# Patient Record
Sex: FEMALE | Race: WHITE | NOT HISPANIC OR LATINO | Employment: FULL TIME | URBAN - METROPOLITAN AREA
[De-identification: names, ages, dates, MRNs, and addresses within clinical notes are randomized per-mention and may not be internally consistent; named-entity substitution may affect disease eponyms.]

---

## 2020-03-26 ENCOUNTER — TELEPHONE (OUTPATIENT)
Dept: DERMATOLOGY | Facility: CLINIC | Age: 45
End: 2020-03-26

## 2020-03-26 ENCOUNTER — TRANSCRIBE ORDERS (OUTPATIENT)
Dept: OTHER | Age: 45
End: 2020-03-26

## 2020-03-26 DIAGNOSIS — C44.792 DERMATOFIBROSARCOMA PROTUBERANS OF LOWER EXTREMITY, RIGHT: Primary | ICD-10-CM

## 2020-03-26 NOTE — TELEPHONE ENCOUNTER
Pt called back and spoke to nurse who informed her of all the information we need (path, pictures, etc) Pt confirmed understanding verbally and fax number was given.     Pt also informed of having the consult via telephone, no complaints or issues.     Call back number was given if pt has any issues.    Dena Edgar LPN

## 2020-03-26 NOTE — TELEPHONE ENCOUNTER
Pt informed nurse that information was sent this morning via fax. Routing to Radha Deluna to see if she has seen the information.    Dena Edgar LPN

## 2020-03-26 NOTE — TELEPHONE ENCOUNTER
Attempted to call patient back regarding urgent referral. LVM and call back number was provided.    We need the referral from her doctors office along with pathology and a photo.    Dena Edgar LPN

## 2020-03-27 ENCOUNTER — TELEPHONE (OUTPATIENT)
Dept: DERMATOLOGY | Facility: CLINIC | Age: 45
End: 2020-03-27

## 2020-03-27 NOTE — TELEPHONE ENCOUNTER
[facility] Call Center    Phone Message    May a detailed message be left on voicemail: yes     Reason for Call: Other: Yue with [facility] (referring clinic) called to follow up on referral. Yue notifed that referral was received and message was sent to clinic for review/scheduling. Yue asked if she could get a call back from the clinic to be notified of when patient will be scheduled. Please call Yue at [phone], ok to leave detailed message per Yue's request.     Action Taken: Message routed to:  [facility] ([facility] Dermatology    Travel Screening: Not Applicable

## 2020-03-27 NOTE — TELEPHONE ENCOUNTER
"I called Yue back and she states that the patient needs to be seen for a right ankle lesion, dermatofibroscarcoma protruberans. She states that their surgeon dr. Alexandre De Jesus spoke with someone here at the [facility] and he was told that we would \"work the patient in.\" I let her know that I would speak with one of our MOHS surgeons to see if this is something that we would operate on or if this needs to go to general surgery or oncology surgery and I would get back to her. This message was routed to Dr. Ellis, Dr. Woo and Dr. Harris.   Sasha Gil [facility]    "

## 2020-03-30 NOTE — TELEPHONE ENCOUNTER
I left a message for the patient to attempt to schedule this telephone consult. I left my call back number for her to call and schedule.    Chikis Braswell  Periop Derm/Surg   [phone]

## 2020-03-31 NOTE — TELEPHONE ENCOUNTER
Called and left patient a message that Dr. Woo says there is no need to quit working prior to her procedure. 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     Chikis Braswell  Periop Derm/Surg   [phone]

## 2020-04-01 ENCOUNTER — TELEPHONE (OUTPATIENT)
Dept: DERMATOLOGY | Facility: CLINIC | Age: 45
End: 2020-04-01

## 2020-04-01 NOTE — TELEPHONE ENCOUNTER
[facility] Call Center    Phone Message    May a detailed message be left on voicemail: yes     Reason for Call: Other: Yue from [facility] calling back to speak with the Rn about the uploaded pictures for the pt.  Please call her back at [phone]     Action Taken: Message routed to:  [facility] Derm    Travel Screening: Not Applicable

## 2020-04-01 NOTE — TELEPHONE ENCOUNTER
Called Yue back, still cannot see photo in care everywhere. Yue working on getting us picture.     Dena Edgar LPN

## 2020-04-01 NOTE — TELEPHONE ENCOUNTER
Called patient to see if she was interested doing a video visit. Decided to stick with her telephone visit.

## 2020-04-07 ENCOUNTER — VIRTUAL VISIT (OUTPATIENT)
Dept: DERMATOLOGY | Facility: CLINIC | Age: 45
End: 2020-04-07
Payer: COMMERCIAL

## 2020-04-07 DIAGNOSIS — C44.792 DERMATOFIBROSARCOMA PROTUBERANS OF LOWER EXTREMITY, RIGHT: Primary | ICD-10-CM

## 2020-04-07 RX ORDER — CETIRIZINE HYDROCHLORIDE 10 MG/1
TABLET ORAL
COMMUNITY
Start: 2019-10-30 | End: 2021-11-10

## 2020-04-07 RX ORDER — VALACYCLOVIR HYDROCHLORIDE 1 G/1
2000 TABLET, FILM COATED ORAL
COMMUNITY
Start: 2020-03-10 | End: 2021-11-10

## 2020-04-07 RX ORDER — ESTRADIOL 0.1 MG/G
CREAM VAGINAL
COMMUNITY
Start: 2019-11-26 | End: 2021-11-10

## 2020-04-07 RX ORDER — BUPROPION HYDROCHLORIDE 150 MG/1
150 TABLET ORAL
COMMUNITY
Start: 2019-06-03

## 2020-04-07 RX ORDER — MONTELUKAST SODIUM 10 MG/1
TABLET ORAL
COMMUNITY
Start: 2019-04-29 | End: 2021-11-10

## 2020-04-07 RX ORDER — AMLODIPINE BESYLATE 5 MG/1
5 TABLET ORAL
COMMUNITY
Start: 2019-05-21 | End: 2020-05-20

## 2020-04-07 RX ORDER — PAROXETINE 40 MG/1
TABLET, FILM COATED ORAL EVERY MORNING
COMMUNITY

## 2020-04-07 RX ORDER — IBUPROFEN 800 MG/1
TABLET, FILM COATED ORAL
COMMUNITY
Start: 2019-08-05

## 2020-04-07 RX ORDER — LORAZEPAM 0.5 MG/1
TABLET ORAL
COMMUNITY
Start: 2019-04-01

## 2020-04-07 RX ORDER — LOSARTAN POTASSIUM 25 MG/1
TABLET ORAL
COMMUNITY
Start: 2019-08-13 | End: 2021-11-10

## 2020-04-07 RX ORDER — LACTOBACILLUS RHAMNOSUS GG 10B CELL
1 CAPSULE ORAL 2 TIMES DAILY
COMMUNITY
End: 2021-11-10

## 2020-04-07 RX ORDER — ATENOLOL 50 MG/1
TABLET ORAL
COMMUNITY
Start: 2019-04-08 | End: 2021-11-10

## 2020-04-07 ASSESSMENT — PAIN SCALES - GENERAL: PAINLEVEL: MILD PAIN (2)

## 2020-04-07 NOTE — PROGRESS NOTES
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  Schedule Mohs surgery.  Anticipate graft vs. Keystone flap repair.  Counseled patient and answered questions about Mohs surgery and r/b of surgery.        Follow-up:   Follow up for surgery next week.     Staff only:    JEANE Camacho M.D.    _____________________________________________________________________________    Dermatology Problem List:  DFSP - ankle    Encounter Date: Apr 7, 2020    CC:   Chief Complaint   Patient presents with     Derm Problem     Marina is having consult for DFSP       History of Present Illness:  I have reviewed the teledermatology information and the nursing intake corresponding to this issue. Marina Urbina is a 44 year old female who presents via teledermatology for evaluation of DFSP on ankle.  Pt has no prior history of skin cancer.  She has had multiple \"cysts\" over the last 3 decades in that area after a traumatic injury.  She noticed \"regrowth of the area\" over the last 6 months.      ROS: Patient is generally feeling well today     Physical Examination:  General: Well-appearing female appropriately-developed individual.  Skin: Awaiting receipt of photos    Labs:    Past Medical History:   There is no problem list on file for this patient.    No past medical history on file.  No past surgical history on file.    Social History:  N/C    Family History:  No family history on file.    Medications:  Current Outpatient Medications   Medication     amLODIPine (NORVASC) 5 MG tablet     atenolol (TENORMIN) 50 MG tablet     buPROPion (WELLBUTRIN XL) 150 MG 24 hr tablet     cetirizine (ZYRTEC) 10 MG tablet     estradiol (ESTRACE) 0.1 MG/GM vaginal cream     " ibuprofen (ADVIL/MOTRIN) 800 MG tablet     lactobacillus rhamnosus, GG, (CULTURELL) capsule     LORazepam (ATIVAN) 0.5 MG tablet     losartan (COZAAR) 25 MG tablet     metFORMIN (GLUCOPHAGE) 500 MG tablet     montelukast (SINGULAIR) 10 MG tablet     PARoxetine (PAXIL) 40 MG tablet     valACYclovir (VALTREX) 1000 mg tablet     No current facility-administered medications for this visit.           Allergies   Allergen Reactions     Bactrim [Sulfamethoxazole W/Trimethoprim]      Flagyl [Metronidazole]      Penicillins Diarrhea and Rash     Verified in [employer]ite[employer]: Y, Severity in Meditech: S           _____________________________________________________________________________    Teledermatology information:  - Location of patient: Home  - Patient presented as: provider referral  - Location of teledermatologist:  [facility] DERMATOLOGIC SURGERY )  - Reason teledermatology is appropriate:  of National Emergency Regarding Coronavirus disease (COVID 19) Outbreak  - Image quality and interpretability: unable to interpret-please re-submit consult  - Physician has received verbal consent for a Video/Photos Visit from the patient? Yes  - In-person dermatology visit recommendation: yes - for jg  - Date of images: n/z  - Service start time:11  - Service end time:11:20  - Date of report: 04/07/20

## 2020-04-07 NOTE — NURSING NOTE
Chief Complaint   Patient presents with     Derm Problem     Marina is having consult for DFSP     Dena Edgar LPN

## 2020-04-08 NOTE — TELEPHONE ENCOUNTER
FUTURE VISIT INFORMATION      FUTURE VISIT INFORMATION:    Date: 4/14/20    Time: 8:30am    Location: [facility]  REFERRAL INFORMATION:    Referring provider:  Roshan Woo MD     Referring providers clinic:  [facility]    Reason for visit/diagnosis  MOHS x1 DFSP    RECORDS REQUESTED FROM:       Clinic name Comments Records Status Imaging Status   [facility] Virtual Visit 4/7/20 Epic    [facility] OV/referral 3/26/20  Biopsy 3/11/20  Surgery 7/11/19 [facility]

## 2020-04-14 ENCOUNTER — OFFICE VISIT (OUTPATIENT)
Dept: DERMATOLOGY | Facility: CLINIC | Age: 45
End: 2020-04-14
Payer: COMMERCIAL

## 2020-04-14 ENCOUNTER — PRE VISIT (OUTPATIENT)
Dept: DERMATOLOGY | Facility: CLINIC | Age: 45
End: 2020-04-14

## 2020-04-14 VITALS — HEART RATE: 71 BPM | DIASTOLIC BLOOD PRESSURE: 92 MMHG | SYSTOLIC BLOOD PRESSURE: 152 MMHG

## 2020-04-14 DIAGNOSIS — C44.792 DERMATOFIBROSARCOMA PROTUBERANS OF LOWER EXTREMITY, RIGHT: Primary | ICD-10-CM

## 2020-04-14 RX ORDER — OXYCODONE AND ACETAMINOPHEN 5; 325 MG/1; MG/1
1 TABLET ORAL EVERY 6 HOURS PRN
Qty: 15 TABLET | Refills: 0 | Status: SHIPPED | OUTPATIENT
Start: 2020-04-14 | End: 2020-04-17

## 2020-04-14 ASSESSMENT — PAIN SCALES - GENERAL: PAINLEVEL: MILD PAIN (2)

## 2020-04-14 NOTE — PROGRESS NOTES
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)  Location:Right ankle lateral)  Postoperative Diagnosis: same  Preoperative Lesion size: 5.0 x 4.8 cm   Final wound size: 6.0 x 5.0 cm    After appropriate discussion and informed consent for Mohs surgery and possible repair of the Mohs surgery defect, the patient underwent Mohs surgery as follows:    Debulking and Confirmatory Biopsy:  Tumor was localized, anesthetized and removed by tangential shave excision (not derived by Mohs surgery) for processing by frozen section pathology in order to confirm the diagnosis and location of the skin cancer and to provide pathological material for subsequent comparison of Mohs sections.The patient then underwent the following surgery.    STAGE I:  The patient was placed on the operating room table.  The area was cleansed with chlorhexidine and infiltrated with 1% Lidocaine and epinephrine. Tumor was debulked. Using a #15-blade, complete excision was made around the tumor in 1 section.  Hemostasis was obtained by electrodesiccation.  A dressing was placed.  Tissue was divided into 8 tissue block(s) that were subsequently mapped, color coded and processed in the Mohs Laboratory.  Microscopic tumor (DFSP) was  found in 7 of the tissue blocks.    STAGE II: The patient returned to the operating room.  By reference to the Mohs map, the area of positivity was delineated, infiltrated with the anesthetic mixture described above and excised in 1 section. Tissue was divided into 2 tissue block(s) that were again mapped, color coded and processed in the Mohs Laboratory.  Hemostasis was obtained in the usual manner and a dressing  placed.   Microscopic tumor was not found in any of the tissue blocks.      With the lesion clear of micrographic tumor, surgery was considered complete.  The defect extended to the muscle/tendon and measured 6.0 x 5.0 cm.    RECONSTRUCTIVE DERMATOLOGIC SURGERY REPORT  We discussed the options for wound management in full with the patient including risks/benefits/possible outcomes.     Reconstruction: Keystone  island flap    PROCEDURE:  A Keystone shaped flap was designed based on the diameter of the primary defect, and positioned in a manner that maximized tissue recruitment. The wound was then debeveled, and the flap was incised sharply using a 15 blade scalpel. Dissection was carried vertically through skin and subcutis to the level of muscular fascia on all sides of the fasciocutaneous island flap. Broad undermining was performed at the non-flap margin of the primary defect. Hemostasis was obtained using electrocoagulation. The flap then first moved into place and secured with  3-0 vicryl deep pulley and buried vertical mattress sutures. The tissue underlying the SCDs were undermined and rotated in to close the primary defect; standing cones were not removed and redundancies left in place.  After securing the flap, the resulting secondary defect was then sutured closed using 3-0 vicryl and 4-0 monocryl buried vertical mattress sutures, with both apices closing via V-Y advancement technique. Next, epidermal edges were approximated using 4-0 prolene sutures with simple interrupted and running-locked techniques. A small area at the superior aspect of the secondary defect (superior shin) was not resurfaced primarily and instead a burow's FTSG was performed. The graft skin was taken from a redundant portion of the superolateral aspect of the keystone flap. This FTSG was defatted and trimmed to fit the defect.  It was sutured into place using 5-0 fast gut sutures. FTSG size was 1.3 x 0.8 cm.At the superior  v-y area of the flap a 3-0 vicryl superficial pulley was placed.  After completion of the procedure, the wound was cleansed with soap/saline, and ointment was applied along the wound surface. A sterile pressure dressing of non-adherent gauze was applied followed by an Unna Boot. This will be changed in 7 days. Wound care instructions were given verbally and in writing. The patient will have sutures removed in 14 days. The patient left the operating suite in stable condition.    Suture removal 14 days; Unna boot change in 7 days.     Patient was given written and verbal wound care procedures prior to discharge.      Staff Involved:    Ward Harris MD  PGY5 Dermatology  Mohs Surgery Fellow  [phone]       Attending attestation:  I was present for key elements of the procedure and immediately available for all other portions of the procedure.  I have reviewed the note and edited it as necessary.    Roshan Woo M.D.  Professor  Director of Dermatologic Surgery  Department of Dermatology  [facility]    Dermatology Surgery Clinic  [facility] Surgery [address] [address]

## 2020-04-14 NOTE — NURSING NOTE
Chief Complaint   Patient presents with     Derm Problem     Marina is here today for MOHS procedure on her right ankle     Dena Edgar LPN

## 2020-04-14 NOTE — LETTER
4/14/2020       RE: Marina Urbina  [address] [facility] [address]     Dear Colleague,    Thank you for referring your patient, Marina Urbina, to the [facility] DERMATOLOGIC SURGERY at [facility]. Please see a copy of my visit note below.    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)  Location:Right ankle lateral)  Postoperative Diagnosis: same  Preoperative Lesion size: 5.0 x 4.8 cm   Final wound size: 6.0 x 5.0 cm    After appropriate discussion and informed consent for Mohs surgery and possible repair of the Mohs surgery defect, the patient underwent Mohs surgery as follows:    Debulking and Confirmatory Biopsy:  Tumor was localized, anesthetized and removed by tangential shave excision (not derived by Mohs surgery) for processing by frozen section pathology in order to confirm the diagnosis and location of the skin cancer and to provide pathological material for subsequent comparison of Mohs sections.The patient then underwent the following surgery.    STAGE I:  The patient was placed on the operating room table.  The area was cleansed with chlorhexidine and infiltrated with 1% Lidocaine and epinephrine. Tumor was debulked. Using a #15-blade, complete excision was made around the tumor in 1 section.  Hemostasis was obtained by electrodesiccation.  A dressing was placed.  Tissue was divided into 8 tissue block(s) that were subsequently mapped, color coded and processed in the Mohs Laboratory.  Microscopic tumor (DFSP) was  found in 7 of the tissue blocks.    STAGE II: The patient returned to the operating room.  By reference to the Mohs map, the  area of positivity was delineated, infiltrated with the anesthetic mixture described above and excised in 1 section. Tissue was divided into 2 tissue block(s) that were again mapped, color coded and processed in the Mohs Laboratory.  Hemostasis was obtained in the usual manner and a dressing placed.   Microscopic tumor was not found in any of the tissue blocks.      With the lesion clear of micrographic tumor, surgery was considered complete.  The defect extended to the muscle/tendon and measured 6.0 x 5.0 cm.    RECONSTRUCTIVE DERMATOLOGIC SURGERY REPORT  We discussed the options for wound management in full with the patient including risks/benefits/possible outcomes.     Reconstruction: Keystone  island flap    PROCEDURE:  A Keystone shaped flap was designed based on the diameter of the primary defect, and positioned in a manner that maximized tissue recruitment. The wound was then debeveled, and the flap was incised sharply using a 15 blade scalpel. Dissection was carried vertically through skin and subcutis to the level of muscular fascia on all sides of the fasciocutaneous island flap. Broad undermining was performed at the non-flap margin of the primary defect. Hemostasis was obtained using electrocoagulation. The flap then first moved into place and secured with  3-0 vicryl deep pulley and buried vertical mattress sutures. The tissue underlying the SCDs were undermined and rotated in to close the primary defect; standing cones were not removed and redundancies left in place.  After securing the flap, the resulting secondary defect was then sutured closed using 3-0 vicryl and 4-0 monocryl buried vertical mattress sutures, with both apices closing via V-Y advancement technique. Next, epidermal edges were approximated using 4-0 prolene sutures with simple interrupted and running-locked techniques. A small area at the superior aspect of the secondary defect (superior shin) was not resurfaced primarily  and instead a burow's FTSG was performed. The graft skin was taken from a redundant portion of the superolateral aspect of the keystone flap. This FTSG was defatted and trimmed to fit the defect.  It was sutured into place using 5-0 fast gut sutures. FTSG size was 1.3 x 0.8 cm.At the superior v-y area of the flap a 3-0 vicryl superficial pulley was placed.  After completion of the procedure, the wound was cleansed with soap/saline, and ointment was applied along the wound surface. A sterile pressure dressing of non-adherent gauze was applied followed by an Unna Boot. This will be changed in 7 days. Wound care instructions were given verbally and in writing. The patient will have sutures removed in 14 days. The patient left the operating suite in stable condition.    Suture removal 14 days; Unna boot change in 7 days.     Patient was given written and verbal wound care procedures prior to discharge.      Staff Involved:    Ward Harris MD  PGY5 Dermatology  Mohs Surgery Fellow  [phone]       Attending attestation:  I was present for key elements of the procedure and immediately available for all other portions of the procedure.  I have reviewed the note and edited it as necessary.    Roshan Woo M.D.  Professor  Director of Dermatologic Surgery  Department of Dermatology  [facility]    Dermatology Surgery Clinic  [facility]  [address] [place] [id number]        Again, thank you for allowing me to participate in the care of your patient.      Sincerely,    Rosahn Woo MD

## 2020-04-14 NOTE — PATIENT INSTRUCTIONS
Wound Care Instructions  I will experience scar, altered skin color, bleeding, swelling, pain, crusting and redness. I may experience altered sensation. Risks are excessive bleeding, infection, muscle weakness, thick (hypertrophic or keloidal) scar, and recurrence,. A second procedure may be recommended to obtain the best cosmetic or functional result.  Possible complications of any surgical procedure are bleeding, infection, scarring, alteration in skin color and sensation, muscle weakness in the area, wound dehiscence or seperation, or recurrence of the lesion or disease. On occasion, after healing, a secondary procedure or revision may be recommended in order to obtain the best cosmetic or functional result.   After your surgery, a pressure bandage will be placed over the area that has sutures. This will help prevent bleeding. Please follow these instructions as they will help you to prevent complications as your wound heals.  For the First week After Surgery:  1. Leave the unna boot on and keep it dry. If it should come loose, you may retape it, but do not take it off.  2. Relax and take it easy. Do not do any vigorous exercise, heavy lifting, or bending forward. This could cause the wound to bleed.  3. Post-operative pain is usually mild. You may take plain or extra strength Tylenol every 4 hours as needed (do not take more than 4,000mg in one day). Do not take any medicine that contains aspirin, ibuprofen or motrin unless you have been recommended these by a doctor.  Avoid alcohol and vitamin E as these may increase your tendency to bleed.  4. You may put an ice pack around the bandaged area for 20 minutes every 2-3 hours. This may help reduce swelling, bruising, and pain. Make sure the ice pack is waterproof so that the pressure bandage does not get wet.   5. You may see a small amount of drainage or blood on your pressure bandage. This is normal. However, if drainage or bleeding continues or saturates the  bandage, you will need to apply firm pressure over the bandage with a washcloth for 15 minutes. If bleeding continues after applying pressure for 15 minutes then go to the nearest emergency room.   Call Us If:  1. You have pain that is not controlled with Tylenol.  2. You have signs or symptoms of an infection, such as: fever over 100 degrees F, redness, warmth, or foul-smelling or yellow/creamy drainage from the wound.  Who should I call with questions?    [facility]: [phone]     [facility]: [phone]    For urgent needs outside of business hours call the [facility] at [phone] and ask for the dermatology resident on call

## 2020-04-28 ENCOUNTER — ALLIED HEALTH/NURSE VISIT (OUTPATIENT)
Dept: DERMATOLOGY | Facility: CLINIC | Age: 45
End: 2020-04-28
Payer: COMMERCIAL

## 2020-04-28 DIAGNOSIS — Z48.02 VISIT FOR SUTURE REMOVAL: ICD-10-CM

## 2020-04-28 DIAGNOSIS — C44.792 DERMATOFIBROSARCOMA PROTUBERANS OF LOWER EXTREMITY, RIGHT: Primary | ICD-10-CM

## 2020-04-28 ASSESSMENT — PAIN SCALES - GENERAL: PAINLEVEL: NO PAIN (0)

## 2020-04-28 NOTE — PROGRESS NOTES
Marina Urbina comes into clinic today at the request of Roshan Woo Ordering Provider for Suture Removal.    This service provided today was under the supervising provider of the day Roshan Woo, who was available if needed.    VIKTORIA PIRESLIBERTY Gray came into the clinic today for Suture Removal. Stated she has some slight pain at night but not too bad. Some numbness feeling as well. She has been changing the Unna Boot every 4 days in [facility]. The healing process has been going very well. I cleaned the area with Saline. Removed all the sutures. Scrubbed off some scabbing with Hydrogen Peroxide. Added the Unna, White Bandage Roll and on top with Co Band. Scheduled the patient to follow up Telephone Visit with Photos next week.

## 2020-04-28 NOTE — PROGRESS NOTES
"Formerly [facility] Dermatology Post-operative Visit note:  Date: April 28, 2020  Dermatology Surgery Clinic  [facility]  [address] [place] [id number]    Surgeon:  Roshan Woo MD    The patient presents for the follow up from their previous surgery Details are below.     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 No functional difficulties.      S: Notes that things seem to be healing well. In last couple days swelling and discomfort have improved significantly. Seen 1 week ago in interval f/u by her general surgeon Dr. Alexandre De Jesus; outside record reviewed. Perscribed Gabapentin 300mg BID at that point. Notes improvement in nerve pain since that time, though does make her sleepy. Has questions about return to normal activities, a work note, and expected time course. No concerns for infection. Tolerated Unna Boot fairly well.       Exam:   Objective: Well appearing, well nourished, NAD.   Affect: Normal  Skin: Focused examination of the R lower leg were performed, revealing:         Assessment and Plan:     1. 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   Appropriately healing surgical site without any signs of infection. Sutures removed today. Complains of some numbness overlying her right dorsal lateral foot- was counseled on this preoperatively and the day of surgery. Likely 2/2 damage to lateral cutaneous branch of the Sural nerve with second MMS layer that required removal of muscular fascia.  Discomfort improved with low dose gabapentin prescribed by Dr. De Jesus.   She does have a small <2 cm area overlying the lateral aspect of the " Use Enhanced Counseling Feature (Automatic): No What Is The Patient's Gender: Female flap where SCDs were rotated together with very mild superficial necrosis; suspect this will continue to do quite well. May require superficial debridement and partial granulation in the future; though optimistically this may not be required.     Site was cleaned and another Unna boot was placed; to be changed in 1 week     Will set up 1 week telephone f/u visit    Work note provided for 2 week duration.     Please refer to previous clinic note for details regarding treatment.    Follow up if any new redness or irritation occurs.    Patient was seen/discussed with:   MD Ward Damon MD  PGY5 Dermatology  Mohs Surgery Fellow  [phone]          Dosing Month 1 (Required For Cumulative Dosing): 40mg Daily Xerosis Normal Treatment: I recommended application of Cetaphil or CeraVe numerous times a day and before going to bed to all dry areas. Months Of Therapy Completed: 1 Hypercholesterolemia Monitoring: I explained this is common when taking isotretinoin. We will monitor closely. Myalgia Monitoring: I explained this is common when taking isotretinoin. If this worsens they will contact us. Ipledge Number (Optional): [phone] Weight Units: pounds Myalgia Treatment: I explained this is common when taking isotretinoin. If this worsens they will contact us. They may try OTC ibuprofen. Xerosis Aggressive Treatment: I recommended application of Cetaphil or CeraVe numerous times a day going to bed to all dry areas. I also prescribed a topical steroid for twice daily use. Headache Monitoring: I recommended monitoring the headaches for now. There is no evidence of increased intracranial pressure. They were instructed to call if the headaches are worsening. Cheilitis Normal Treatment: I recommended application of Vaseline or Aquaphor numerous times a day (as often as every hour) and before going to bed. Retinoid Dermatitis Normal Treatment: I recommended more frequent application of Cetaphil or CeraVe to the areas of dermatitis. Retinoid Dermatitis Aggressive Treatment: I recommended more frequent application of Cetaphil or CeraVe to the areas of dermatitis. I also prescribed a topical steroid for twice daily use until the dermatitis resolves. Add Associated Diagnosis When Managing Medication Side Effects: Yes Xerosis Normal Treatment: I recommended application of Cetaphil or CeraVe numerous times a day going to bed to all dry areas. Nosebleeds Normal Treatment: I explained this is common when taking isotretinoin. I recommended saline mist in each nostril multiple times a day. If this worsens they will contact us. Counseling Text: I reviewed the side effect in detail. Patient should get monthly blood tests, not donate blood, not drive at night if vision affected, and not share medication. Kilograms Preamble Statement (Weight Entered In Details Tab): Reported Weight in kilograms: Xerosis Aggressive Treatment: I recommended application of Cetaphil or CeraVe numerous times a day and before going to bed to all dry areas. I also prescribed a topical steroid for twice daily use. Patient Weight (Optional But Required For Cumulative Dose-Numbers And Decimals Only): 150 Female Pregnancy Counseling Text: Female patients should also be on two forms of birth control while taking this medication and for one month after their last dose. Pounds Preamble Statement (Weight Entered In Details Tab): Reported Weight in pounds: Cheilitis Aggressive Treatment: I recommended application of Vaseline or Aquaphor numerous times a day (as often as every hour) and before going to bed. I also prescribed a topical steroid for twice daily use. Are Labs Available For Review?: No- Not Drawn Yet Detail Level: Zone Male Completion Statement: After discussing his treatment course we decided to discontinue isotretinoin therapy at this time. He shouldn't donate blood for one month after the last dose. He should call with any new symptoms of depression. Female Completion Statement: After discussing her treatment course we decided to discontinue isotretinoin therapy at this time. I explained that she would need to continue her birth control methods for at least one month after the last dosage. She should also get a pregnancy test one month after the last dose. She shouldn't donate blood for one month after the last dose. She should call with any new symptoms of depression.

## 2020-04-28 NOTE — LETTER
[facility]  Dermatology  [address] [place] [address]  3rd [place] [address]  Phone: [phone]  Fax: [phone]         To Whom it may concern,     Marina Urbina was seen in our clinic today 4/28/2020 in postoperative evaluation. We ask that she be excused from normal work duties for the next two weeks during this healing process after surgery. We anticipate a potential return to typical duties 5/12/2020.       Please do not hesitate to contact with questions or concerns.             Ward Harris MD  PGY5 Dermatology  Mohs Surgery Fellow  [phone]

## 2020-05-05 ENCOUNTER — TELEPHONE (OUTPATIENT)
Dept: DERMATOLOGY | Facility: CLINIC | Age: 45
End: 2020-05-05

## 2020-05-05 ENCOUNTER — VIRTUAL VISIT (OUTPATIENT)
Dept: DERMATOLOGY | Facility: CLINIC | Age: 45
End: 2020-05-05
Payer: COMMERCIAL

## 2020-05-05 DIAGNOSIS — Z98.890 POST-OPERATIVE STATE: Primary | ICD-10-CM

## 2020-05-05 RX ORDER — GABAPENTIN 300 MG/1
100 CAPSULE ORAL 3 TIMES DAILY
COMMUNITY
Start: 2020-04-24 | End: 2021-04-24

## 2020-05-05 ASSESSMENT — PAIN SCALES - GENERAL: PAINLEVEL: MILD PAIN (2)

## 2020-05-05 NOTE — TELEPHONE ENCOUNTER
I called the patient to check her in for her appointment, I left a voice message to return our call and to also send in photos.     Yessi MIGUEL CMA

## 2020-05-05 NOTE — PATIENT INSTRUCTIONS
[facility] Teledermatology Visit    Thank you for allowing us to participate in your care. Your findings, instructions and follow-up plan are as follows:    Your flap is healing well.  You may shower now and get the area wet.    When should I call my doctor?    If you are worsening or not improving, please, contact us or seek urgent care as noted below.     Who should I call with questions (adults)?    [facility] (adult and pediatric): [phone]     [facility] (adult): [phone]    For urgent needs outside of business hours call the [facility] at [phone] and ask for the dermatology resident on call    If this is a medical emergency and you are unable to reach an ER, Call 911      Who should I call with questions (pediatric)?  [facility]- Pediatric Dermatology  Dr. Anushka Hernandez, Dr. Tiffany Steen, Dr. Lilia Pate, Kristi Squires, PA  Dr. Marina Be, Dr. Radha Stoll & Dr. Toñito Hairston  Non Urgent  Nurse Triage Line; [phone]- Mitra and Kate RN Care Coordinators   Yasmine (/Complex ) [phone]    If you need a prescription refill, please contact your pharmacy. Refills are approved or denied by our Physicians during normal business hours, Monday through Fridays  Per office policy, refills will not be granted if you have not been seen within the past year (or sooner depending on your child's condition)    Scheduling Information:  Pediatric Appointment Scheduling and Call Center [phone]  Radiology Scheduling- [phone]  Sedation Unit Scheduling- [phone]  [place] Scheduling- General [phone]; Pediatric Dermatology [phone]  Main  Services: [phone]  Dutch: [phone]  [place] Islander: [phone]  Hmong/Welsh/Jatin: [phone]  Preadmission Nursing Department Fax Number: [phone] (Fax all pre-operative  paperwork to this number)    For urgent matters arising during evenings, weekends, or holidays that cannot wait for normal business hours please call [phone] and ask for the Dermatology Resident On-Call to be paged.

## 2020-05-05 NOTE — NURSING NOTE
Chief Complaint   Patient presents with     Derm Problem     Follow up, pt reports a lot of swelling.      Dena Edgar LPN

## 2020-05-05 NOTE — PROGRESS NOTES
Brief telephone note:     S: Since the last visit patient developed some light bleeding; soaked unna boot. Sought evaluation with her general surgeon who found the wound to be well appearing and with appropriate hemostasis. Since that time, she notes that the suture line has become slightly spread laterally. Mild increased crusting. Has tolerated unna boot well to date. Swelling increased after a 4 hour car ride. Slowly improving sense. Foot/ankle function appropriate.     Exam:   Focused exam of the foot and ankle reveals a well healing keystone flap. Mild epidermal slough overlying lateral aspect. Hemorhagic crust superiorly. Mild edema noted in the foot/ankle. No signs of infection.       A/P:  Well healing keystone flap. No signs of infection. No evidence DFSP recurrence.   Mild epidermal slough and minimal separation at lateral aspect of the flap. Overall, we are quite pleased with things and suspect excellent long term outcome is likely.   -Recheck wound with telephone visit/telederm in 1 week.   -Compression stocking use and wound care instructions provided.   -Red flags reviewed.     Ward Harris MD  PGY5 Dermatology  Mohs Surgery Fellow  [phone]

## 2020-05-12 ENCOUNTER — VIRTUAL VISIT (OUTPATIENT)
Dept: DERMATOLOGY | Facility: CLINIC | Age: 45
End: 2020-05-12
Payer: COMMERCIAL

## 2020-05-12 DIAGNOSIS — C44.792 DERMATOFIBROSARCOMA PROTUBERANS OF LOWER EXTREMITY, RIGHT: Primary | ICD-10-CM

## 2020-05-12 NOTE — PROGRESS NOTES
Brief Note:     Dr. Woo spoke with Ms. Urbina over the phone. Teledermatology photos reviewed; excellent quality.   Mild-moderate ongoing swelling. Interval improvement in wound healing. No concerns. No signs of infection. Overall we are pleased with progress. Recommend ongoing dressings and compression stocking use.     We will plan to follow-up via telehealth visit on 5/26/2020; sooner PRN.     Ward Harris MD  PGY5 Dermatology  Mohs Surgery Fellow  [phone]     Attending Attestation  I attest that the Scribe recorded the interview and exam that I personally performed.  I have reviewed the note and edited it as necessary.    Roshan Woo M.D.  Professor  Director of Dermatologic Surgery  Department of Dermatology  [facility]

## 2020-05-12 NOTE — PATIENT INSTRUCTIONS
[facility] Teledermatology Visit    Thank you for allowing us to participate in your care. Your findings, instructions and follow-up plan are as follows:      Your wound looks like it's doing well.  We will see you for another two     When should I call my doctor?    If you are worsening or not improving, please, contact us or seek urgent care as noted below.     Who should I call with questions (adults)?    [facility] (adult and pediatric): [phone]     [facility] (adult): [phone]    For urgent needs outside of business hours call the [facility] at [phone] and ask for the dermatology resident on call    If this is a medical emergency and you are unable to reach an ER, Call 911      Who should I call with questions (pediatric)?  [facility]- Pediatric Dermatology  Dr. Anushka Hernandez, Dr. Tiffany Steen, Dr. Lilia Pate, Kristi Squires, PA  Dr. Marina Be, Dr. Radha Stoll & Dr. Toñito Hairston  Non Urgent  Nurse Triage Line; [phone]- Mitra and Kate RN Care Coordinators   Yasmine (/Complex ) [phone]    If you need a prescription refill, please contact your pharmacy. Refills are approved or denied by our Physicians during normal business hours, Monday through Fridays  Per office policy, refills will not be granted if you have not been seen within the past year (or sooner depending on your child's condition)    Scheduling Information:  Pediatric Appointment Scheduling and Call Center [phone]  Radiology Scheduling- [phone]  Sedation Unit Scheduling- [phone]  [place] Scheduling- General [phone]; Pediatric Dermatology [phone]  Main  Services: [phone]  Setswana: [phone]  Tongan: [phone]  Hmong/Hungarian/Jatin: [phone]  Preadmission Nursing Department Fax Number: [phone] (Fax all  pre-operative paperwork to this number)    For urgent matters arising during evenings, weekends, or holidays that cannot wait for normal business hours please call [phone] and ask for the Dermatology Resident On-Call to be paged.

## 2020-05-26 ENCOUNTER — VIRTUAL VISIT (OUTPATIENT)
Dept: DERMATOLOGY | Facility: CLINIC | Age: 45
End: 2020-05-26
Payer: COMMERCIAL

## 2020-05-26 ENCOUNTER — TELEPHONE (OUTPATIENT)
Dept: DERMATOLOGY | Facility: CLINIC | Age: 45
End: 2020-05-26

## 2020-05-26 ENCOUNTER — MYC MEDICAL ADVICE (OUTPATIENT)
Dept: DERMATOLOGY | Facility: CLINIC | Age: 45
End: 2020-05-26

## 2020-05-26 DIAGNOSIS — C44.792 DERMATOFIBROSARCOMA PROTUBERANS OF RIGHT LOWER EXTREMITY: Primary | ICD-10-CM

## 2020-05-26 ASSESSMENT — PAIN SCALES - GENERAL: PAINLEVEL: MILD PAIN (2)

## 2020-05-26 NOTE — LETTER
5/26/2020       RE: Marina Urbina  [address] [address]     Dear Colleague,    Thank you for referring your patient, Marina Urbina, to the [facility] DERMATOLOGIC SURGERY at [facility]. Please see a copy of my visit note below.    Brief telephone note:     S: Pt is doing quite well. Seeing WOC BIW. Currently using: Medihoney, dermablue foam, Unnaboot. Light sharp debridement has been performed. Sensation improving significantly . Walking/ambulating well without issues.    Exam:   Focused exam of the foot and ankle reveals a well healing keystone flap. Thin ulceration present along much of the lateral/superior suture line > medial.  Improved edema noted in the foot/ankle. No signs of infection.        A/P:  Healing keystone flap. Mild epidermal slough and dehiscence along suture line leading to thin ulcerations. No signs of infection. No evidence DFSP recurrence. Overall we suspect excellent long term outcome is likely.   -Recheck wound with telephone visit/telederm in 3 weesk.   -Pt to continue following with WOC.   -Continue compression stocking/Unna boot use and leg elevation.   -Red flags reviewed.     Ward Harris MD  PGY5 Dermatology  Mohs Surgery Fellow  [phone]       Roshan Woo MD            Again, thank you for allowing me to participate in the care of your patient.      Sincerely,    Roshan Woo MD      
No

## 2020-05-26 NOTE — TELEPHONE ENCOUNTER
Attempted to call patient, no answer. LVM informing patient we were calling to check her in for her telephone visit with Dr. Woo and we will try again soon.    Dena Edgar LPN

## 2020-05-26 NOTE — NURSING NOTE
Chief Complaint   Patient presents with     Derm Problem     Marina states she is doing well, no complaints.      Dena Edgar LPN

## 2020-05-26 NOTE — PATIENT INSTRUCTIONS
[facility] Teledermatology Visit    Thank you for allowing us to participate in your care. Your findings, instructions and follow-up plan are as follows:      Your flap is continuing to heal.    When should I call my doctor?    If you are worsening or not improving, please, contact us or seek urgent care as noted below.     Who should I call with questions (adults)?    [facility] (adult and pediatric): [phone]     [facility] (adult): [phone]    For urgent needs outside of business hours call the [facility] at [phone] and ask for the dermatology resident on call    If this is a medical emergency and you are unable to reach an ER, Call [phone]      Who should I call with questions (pediatric)?  [facility]- Pediatric Dermatology  Dr. Anushka Hernandez, Dr. Tiffany Steen, Dr. Lilia Pate, Kristi Squires, PA  Dr. Marina Be, Dr. Radha Stoll & Dr. Toñito Hairston  Non Urgent  Nurse Triage Line; [phone]- Mitra and Kate MOSQUEDA Care Coordinators   Yasmine (/Complex ) [phone]    If you need a prescription refill, please contact your pharmacy. Refills are approved or denied by our Physicians during normal business hours, Monday through Fridays  Per office policy, refills will not be granted if you have not been seen within the past year (or sooner depending on your child's condition)    Scheduling Information:  Pediatric Appointment Scheduling and Call Center [phone]  Radiology Scheduling- [phone]  Sedation Unit Scheduling- [phone]  [place] Scheduling- General [phone]; Pediatric Dermatology [phone]  Main  Services: [phone]  Mongolian: [phone]  Indian: [phone]  Hmong/Michael/Malian: [phone]  Preadmission Nursing Department Fax Number: [phone] (Fax all pre-operative paperwork to this number)    For  urgent matters arising during evenings, weekends, or holidays that cannot wait for normal business hours please call [phone] and ask for the Dermatology Resident On-Call to be paged.

## 2020-05-26 NOTE — PROGRESS NOTES
Brief telephone note:     S: Pt is doing quite well. Seeing WOC BIW. Currently using: Medihoney, dermablue foam, Unnaboot. Light sharp debridement has been performed. Sensation improving significantly . Walking/ambulating well without issues.    Exam:   Focused exam of the foot and ankle reveals a well healing keystone flap. Thin ulceration present along much of the lateral/superior suture line > medial.  Improved edema noted in the foot/ankle. No signs of infection.        A/P:  Healing keystone flap. Mild epidermal slough and dehiscence along suture line leading to thin ulcerations. No signs of infection. No evidence DFSP recurrence. Overall we suspect excellent long term outcome is likely.   -Recheck wound with telephone visit/telederm in 3 weesk.   -Pt to continue following with WOC.   -Continue compression stocking/Unna boot use and leg elevation.   -Red flags reviewed.     Ward Harris MD  PGY5 Dermatology  Mohs Surgery Fellow  [phone]       Roshan Woo MD

## 2020-06-17 ENCOUNTER — VIRTUAL VISIT (OUTPATIENT)
Dept: DERMATOLOGY | Facility: CLINIC | Age: 45
End: 2020-06-17
Payer: COMMERCIAL

## 2020-06-17 DIAGNOSIS — L90.5 SCAR: Primary | ICD-10-CM

## 2020-06-17 ASSESSMENT — PAIN SCALES - GENERAL: PAINLEVEL: NO PAIN (0)

## 2020-06-17 NOTE — PATIENT INSTRUCTIONS
[facility] Teledermatology Visit    Thank you for allowing us to participate in your care. Your findings, instructions and follow-up plan are as follows:      Your flap is coming along nicely.    When should I call my doctor?    If you are worsening or not improving, please, contact us or seek urgent care as noted below.     Who should I call with questions (adults)?    [facility] (adult and pediatric): [phone]     [facility] (adult): [phone]    For urgent needs outside of business hours call the [facility] at [phone] and ask for the dermatology resident on call    If this is a medical emergency and you are unable to reach an ER, Call [phone]      Who should I call with questions (pediatric)?  [facility]- Pediatric Dermatology  Dr. Anushka Hernandez, Dr. Tiffany Steen, Dr. Lilia Pate, Kristi Squires, PA  Dr. Marina Be, Dr. Radha Sotll & Dr. Toñito Hairston  Non Urgent  Nurse Triage Line; [phone]- Mitra and Kate MOSQUEDA Care Coordinators   Yasmine (/Complex ) [phone]    If you need a prescription refill, please contact your pharmacy. Refills are approved or denied by our Physicians during normal business hours, Monday through Fridays  Per office policy, refills will not be granted if you have not been seen within the past year (or sooner depending on your child's condition)    Scheduling Information:  Pediatric Appointment Scheduling and Call Center [phone]  Radiology Scheduling- [phone]  Sedation Unit Scheduling- [phone]  [place] Scheduling- General [phone]; Pediatric Dermatology [phone]  Main  Services: [phone]  Puerto Rican: [phone]  Swazi: [phone]  Hmong/Michael/Icelandic: [phone]  Preadmission Nursing Department Fax Number: [phone] (Fax all pre-operative paperwork to this number)    For  urgent matters arising during evenings, weekends, or holidays that cannot wait for normal business hours please call [phone] and ask for the Dermatology Resident On-Call to be paged.

## 2020-06-17 NOTE — NURSING NOTE
Chief Complaint   Patient presents with     Derm Problem     concerns: nerve damage in foot, stumbling and tripping.      Shahla Casarez, EMT

## 2020-06-17 NOTE — LETTER
"6/17/2020       RE: Marina Urbina  [address] [address]     Dear Colleague,    Thank you for referring your patient, Marina Urbina, to the [facility] DERMATOLOGIC SURGERY at [facility]. Please see a copy of my visit note below.    11:44-11:49    Follow-up 1 month televisit  Consider ILK inferiorly if continuing to hypertrophy.   Discussed potential of PDL treatment down the road once things have fully re-epithelialized if ongoing erythema.       Brief telephone note:     S: Pt is doing quite well. Continues to follow with WOC regularly. Notes that things have continued to improve since the last telephone visit. eeing WOC BIW. Currently using: Medihoney, dermablue foam, Unnaboot. Light sharp debridement has been performed. Sensation improving significantly. Has noted that she is slightly more \"clumsy\" since the surgery as far as tripping. Walking/ambulating well without issues.    Exam:   Focused exam of the foot and ankle reveals a well healing keystone flap. Thin ulceration present along much of the lateral/superior suture line > medial.  Improved edema noted in the foot/ankle. No signs of infection.        A/P:  Healing keystone flap. Mild epidermal slough and dehiscence along suture line leading to thin ulcerations. No signs of infection. No evidence DFSP recurrence. Overall we suspect excellent long term outcome is likely.   -Recheck wound with telephone visit/telederm in 3 weesk.   -Pt to continue following with WOC.   -Continue compression stocking/Unna boot use and leg elevation.   -Red flags reviewed.     Ward Harris MD  PGY5 Dermatology  Mohs Surgery Fellow  [phone]       MD Roshan Shin MD            "

## 2020-06-17 NOTE — PROGRESS NOTES
"11:44-11:49    Follow-up 1 month televisit  Consider ILK inferiorly if continuing to hypertrophy.   Discussed potential of PDL treatment down the road once things have fully re-epithelialized if ongoing erythema.       Brief telephone note:     S: Pt is doing quite well. Continues to follow with WOC regularly. Notes that things have continued to improve since the last telephone visit. eeing WOC BIW. Currently using: Medihoney, dermablue foam, Unnaboot. Light sharp debridement has been performed. Sensation improving significantly. Has noted that she is slightly more \"clumsy\" since the surgery as far as tripping. Walking/ambulating well without issues.    Exam:   Focused exam of the foot and ankle reveals a well healing keystone flap. Thin ulceration present along much of the lateral/superior suture line > medial.  Improved edema noted in the foot/ankle. No signs of infection.        A/P:  Healing keystone flap. Mild epidermal slough and dehiscence along suture line leading to thin ulcerations. No signs of infection. No evidence DFSP recurrence. Overall we suspect excellent long term outcome is likely.   -Recheck wound with telephone visit/telederm in 3 weesk.   -Pt to continue following with WOC.   -Continue compression stocking/Unna boot use and leg elevation.   -Red flags reviewed.     Ward Harris MD  PGY5 Dermatology  Mohs Surgery Fellow  [phone]       MD Roshan Shin MD            "

## 2020-07-14 ENCOUNTER — TELEPHONE (OUTPATIENT)
Dept: DERMATOLOGY | Facility: CLINIC | Age: 45
End: 2020-07-14

## 2020-07-14 NOTE — TELEPHONE ENCOUNTER
Called to change telephone appt to morning per Dr. Woo and send photos via [place]. Left message with number to call back.

## 2020-07-15 ENCOUNTER — MYC MEDICAL ADVICE (OUTPATIENT)
Dept: DERMATOLOGY | Facility: CLINIC | Age: 45
End: 2020-07-15

## 2020-07-15 ENCOUNTER — VIRTUAL VISIT (OUTPATIENT)
Dept: DERMATOLOGY | Facility: CLINIC | Age: 45
End: 2020-07-15
Payer: COMMERCIAL

## 2020-07-15 DIAGNOSIS — C44.792 DERMATOFIBROSARCOMA PROTUBERANS OF LOWER EXTREMITY, RIGHT: Primary | ICD-10-CM

## 2020-07-15 ASSESSMENT — PAIN SCALES - GENERAL: PAINLEVEL: MILD PAIN (3)

## 2020-07-15 NOTE — PROGRESS NOTES
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  Notices some tightness to scar.  Open areas have healed.    Photos show areas of scar hypertrophy    [place] flap repair with scar hypertrophy.  Patient noticing tightness of skin with foot flexion.  Schedule patient for ILK/PDL treatment.    Roshan Woo MD

## 2020-07-15 NOTE — LETTER
7/15/2020       RE: Marina Urbina  [address] [facility] [address]     Dear Colleague,    Thank you for referring your patient, Marina Urbina, to the [facility] DERMATOLOGIC SURGERY at [facility]. Please see a copy of my visit note below.    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  Notices some tightness to scar.  Open areas have healed.    Photos show areas of scar hypertrophy    [place] flap repair with scar hypertrophy.  Patient noticing tightness of skin with foot flexion.  Schedule patient for ILK/PDL treatment.    Roshan Woo MD      Again, thank you for allowing me to participate in the care of your patient.      Sincerely,    Roshan Woo MD

## 2020-07-15 NOTE — PATIENT INSTRUCTIONS
[facility] Teledermatology Visit    Thank you for allowing us to participate in your care. Your findings, instructions and follow-up plan are as follows:      We will schedule you for scar treatment in the next 1-2 months.    When should I call my doctor?    If you are worsening or not improving, please, contact us or seek urgent care as noted below.     Who should I call with questions (adults)?    [facility] (adult and pediatric): [phone]     [facility] (adult): [phone]    For urgent needs outside of business hours call the [facility] at [phone] and ask for the dermatology resident on call    If this is a medical emergency and you are unable to reach an ER, Call 1      Who should I call with questions (pediatric)?  [facility]- Pediatric Dermatology  Dr. Anushka Hernandez, Dr. Tiffany Steen, Dr. Lilia Pate, Kristi Squires, PA  Dr. Marina Be, Dr. Radha Stoll & Dr. Toñito Hairston  Non Urgent  Nurse Triage Line; [phone]- Mitra and Kate MOSQUEDA Care Coordinators   Yasmine (/Complex ) [phone]    If you need a prescription refill, please contact your pharmacy. Refills are approved or denied by our Physicians during normal business hours, Monday through Fridays  Per office policy, refills will not be granted if you have not been seen within the past year (or sooner depending on your child's condition)    Scheduling Information:  Pediatric Appointment Scheduling and Call Center [phone]  Radiology Scheduling- [phone]  Sedation Unit Scheduling- [phone]  [place] Scheduling- General [phone]; Pediatric Dermatology [phone]  Main  Services: [phone]  Ukrainian: [phone]  Mozambican: [phone]  Hmong/Tamazight/Jatin: [phone]  Preadmission Nursing Department Fax Number: [phone] (Fax all pre-operative  paperwork to this number)    For urgent matters arising during evenings, weekends, or holidays that cannot wait for normal business hours please call [phone] and ask for the Dermatology Resident On-Call to be paged.

## 2020-08-05 ENCOUNTER — OFFICE VISIT (OUTPATIENT)
Dept: DERMATOLOGY | Facility: CLINIC | Age: 45
End: 2020-08-05
Payer: COMMERCIAL

## 2020-08-05 DIAGNOSIS — L90.5 PAIN IN SURGICAL SCAR: ICD-10-CM

## 2020-08-05 DIAGNOSIS — R52 PAIN IN SURGICAL SCAR: ICD-10-CM

## 2020-08-05 DIAGNOSIS — L90.5 SCAR: Primary | ICD-10-CM

## 2020-08-05 RX ORDER — TRIAMCINOLONE ACETONIDE 40 MG/ML
40 INJECTION, SUSPENSION INTRA-ARTICULAR; INTRAMUSCULAR ONCE
Status: COMPLETED | OUTPATIENT
Start: 2020-08-05 | End: 2020-08-06

## 2020-08-05 NOTE — PROGRESS NOTES
Follow-up Note    Dermatology Surgery Clinic  [facility]  [address] [place] [address]    Subjective: Ms. 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     Overall, the patient is doing very well. She continues to experience some tightness and sense of decreased mobility of the right ankle. No pain or erythema.     Objective: An exam of the right lower extremity was performed today   - Right lateral and anterior lower leg with keystone flap with hypertrophic scarring. No evidence of recurrence.    Assessment and Plan:     1) History of DFSP s/p MMS and keystone flap closure on 4/14/2020  - Discussed the nature of the diagnosis/condition  - Recommend proceeding with PDL and ILK today    Patient was discussed with and evaluated by attending physician Dr. Chilo Castañeda- VBeam(Pulsed Dye Laser) Procedure Note: Medical      Procedure Date: Aug 5, 2020    Attending Staff Surgeon: Roshan Woo    Fellow Surgeon: Yashira    Operating Room Data:     Surgery/Procedure Date:    SAME     Pre-operative Diagnosis:   Hypertrophic scar  Location: Right lower extremity / ankle  Size(cm2): Approximately 12 x 10 cm  Number of lesions: 1 (ring shaped scar)    Operation/Procedure    Vbeam pulsed dye laser treatment#: 1       Post-operative Diagnosis:  SAME    Laser Settings:  Energy:6.0 J/cm2  Spot size:10mm  Pulse width:  1 mS (0.45 thru 40 mS)    Fotofinder photos: No    Anesthesia:  None    Description of Operation/Procedure:   The nature and purpose of the procedure, associated risks, possible consequences, complications and alternative methods of treatment were explained in detail, this includes but is not limited to hyperpigmentation, hypopigmentation, scarring, bruising, hair loss pain/discomfort, eye injury, and blister. We reviewed that the outcome could be  any of the following: no improvement, slight improvement or change in skin color & texture, the skin might be permanently lighter or darker, and though uncommon, superficial scarring may occur.  Multiple treatments may be recommended.   A photo and operative consent were obtained. Time-out was performed.The patient was positioned to optimally expose the area treated. Protective eyewear was worn by the patient and goggles on all personnel in the treatment room. The patient confirmed the site to be treated. The laser energy output was verified by meter reading.      The clinically evident lesion(s) was/were treated with Carmela Vbeam pulsed dye laser (595 nm) beam as above. A total of 100 pulses were used. The patient tolerated the procedure well and no complications were noted. Post operative instructions were provided. The total laser operation and preparation time was 10 minutes.      The patient will follow-up in 6-8 weeks.    Dr. Woo staffed the patient was present for the entire procedure.    Staff Involved:  Staff (Chilo) and Fellow (Yashira)    Drug Administration Record    Prior to injection, verified patient identity using patient's name and date of birth.  Due to injection administration, patient instructed to remain in clinic for 15 minutes  afterwards, and to report any adverse reaction to me immediately.    Drug Name: triamcinolone acetonide(kenalog)  Dose: 1mL of triamcinolone 40mg/mL, 40mg dose  Route administered: ID  NDC #: Kenalog-40 ([address]-6)  Amount of waste(mL):0mL  Reason for waste: Multi dose vial    LOT #: [id number]  SITE: see note  : [employer]  EXPIRATION DATE: 03/2022    Jaime Ac MD  PGY-6    Micrographic Surgery and Dermatologic Oncology Fellow  August 5, 2020      Attending attestation:  I was present for key elements of the procedure and immediately available for all other portions of the procedure.  I have reviewed the note and edited it as  necessary.    Roshan Woo M.D.  Professor  Director of Dermatologic Surgery  Department of Dermatology  [facility]    Dermatology Surgery Clinic  [facility] [place] [id number]

## 2020-08-05 NOTE — LETTER
8/5/2020       RE: Marina Urbina  [address] [facility] [address]     Dear Colleague,    Thank you for referring your patient, Marina Urbina, to the [facility] DERMATOLOGIC SURGERY at [facility]. Please see a copy of my visit note below.    Follow-up Note    Dermatology Surgery Clinic  [facility]  [address] [place] [address]    Subjective: Ms. 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     Overall, the patient is doing very well. She continues to experience some tightness and sense of decreased mobility of the right ankle. No pain or erythema.     Objective: An exam of the right lower extremity was performed today   - Right lateral and anterior lower leg with keystone flap with hypertrophic scarring. No evidence of recurrence.    Assessment and Plan:     1) History of DFSP s/p MMS and keystone flap closure on 4/14/2020  - Discussed the nature of the diagnosis/condition  - Recommend proceeding with PDL and ILK today    Patient was discussed with and evaluated by attending physician Dr. Woo    Laser- VBeam(Pulsed Dye Laser) Procedure Note: Medical      Procedure Date: Aug 5, 2020    Attending Staff Surgeon: Roshan Woo    Fellow Surgeon: Yashira    Operating Room Data:     Surgery/Procedure Date:    SAME     Pre-operative Diagnosis:   Hypertrophic scar  Location: Right lower extremity / ankle  Size(cm2): Approximately 12 x 10 cm  Number of lesions: 1 (ring shaped scar)    Operation/Procedure    Vbeam pulsed dye laser treatment#: 1       Post-operative Diagnosis:  SAME    Laser Settings:  Energy:6.0 J/cm2  Spot size:10mm  Pulse width:  1 mS (0.45 thru 40 mS)    Fotofinder photos: No    Anesthesia:  None    Description of Operation/Procedure:   The nature and purpose of the procedure, associated  risks, possible consequences, complications and alternative methods of treatment were explained in detail, this includes but is not limited to hyperpigmentation, hypopigmentation, scarring, bruising, hair loss pain/discomfort, eye injury, and blister. We reviewed that the outcome could be any of the following: no improvement, slight improvement or change in skin color & texture, the skin might be permanently lighter or darker, and though uncommon, superficial scarring may occur.  Multiple treatments may be recommended.   A photo and operative consent were obtained. Time-out was performed.The patient was positioned to optimally expose the area treated. Protective eyewear was worn by the patient and goggles on all personnel in the treatment room. The patient confirmed the site to be treated. The laser energy output was verified by meter reading.      The clinically evident lesion(s) was/were treated with Carmela Vbeam pulsed dye laser (595 nm) beam as above. A total of 100 pulses were used. The patient tolerated the procedure well and no complications were noted. Post operative instructions were provided. The total laser operation and preparation time was 10 minutes.      The patient will follow-up in 6-8 weeks.    Dr. Woo staffed the patient was present for the entire procedure.    Staff Involved:  Staff (Chilo) and Fellow (Yashira)    Drug Administration Record    Prior to injection, verified patient identity using patient's name and date of birth.  Due to injection administration, patient instructed to remain in clinic for 15 minutes  afterwards, and to report any adverse reaction to me immediately.    Drug Name: triamcinolone acetonide(kenalog)  Dose: 1mL of triamcinolone 40mg/mL, 40mg dose  Route administered: ID  NDC #: Kenalog-40 ([address]-2)  Amount of waste(mL):0mL  Reason for waste: Multi dose vial    LOT #: [id number]  SITE: see note  : [employer]  EXPIRATION DATE: 03/2022    Jaime  MD Yashira  PGY-6    Micrographic Surgery and Dermatologic Oncology Fellow  August 5, 2020      Attending attestation:  I was present for key elements of the procedure and immediately available for all other portions of the procedure.  I have reviewed the note and edited it as necessary.    Roshan Woo M.D.  Professor  Director of Dermatologic Surgery  Department of Dermatology  [facility]    Dermatology Surgery Clinic  [facility] [place] [address]            Again, thank you for allowing me to participate in the care of your patient.      Sincerely,    Roshan Woo MD

## 2020-08-05 NOTE — NURSING NOTE
Chief Complaint   Patient presents with     Derm Problem     follow up, injections possible and laser     Shahla Casarez, EMT

## 2020-08-06 RX ADMIN — TRIAMCINOLONE ACETONIDE 40 MG: 40 INJECTION, SUSPENSION INTRA-ARTICULAR; INTRAMUSCULAR at 07:52

## 2020-09-23 ENCOUNTER — OFFICE VISIT (OUTPATIENT)
Dept: DERMATOLOGY | Facility: CLINIC | Age: 45
End: 2020-09-23
Payer: COMMERCIAL

## 2020-09-23 DIAGNOSIS — Z98.890 HISTORY OF MOHS MICROGRAPHIC SURGERY FOR SKIN CANCER: ICD-10-CM

## 2020-09-23 DIAGNOSIS — L90.5 POSTOPERATIVE SCAR: ICD-10-CM

## 2020-09-23 DIAGNOSIS — Z85.828 HISTORY OF MOHS MICROGRAPHIC SURGERY FOR SKIN CANCER: ICD-10-CM

## 2020-09-23 DIAGNOSIS — C44.599 DERMATOFIBROSARCOMA PROTUBERANS OF TRUNK: Primary | ICD-10-CM

## 2020-09-23 DIAGNOSIS — Z98.890 POSTOPERATIVE SCAR: ICD-10-CM

## 2020-09-23 ASSESSMENT — PAIN SCALES - GENERAL: PAINLEVEL: NO PAIN (0)

## 2020-09-23 NOTE — LETTER
9/23/2020       RE: Marina Urbina  [address] [facility] [address]     Dear Colleague,    Thank you for referring your patient, Marina Urbina, to the [facility] DERMATOLOGIC SURGERY at [facility]. Please see a copy of my visit note below.    Micrographic Surgery Follow Up Note and Laser Treatment Note    Dermatology Surgery Clinic  [facility]  [address] [place] [address]    Subjective: Ms. 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     Overall, the patient is doing very well. She continues to experience some tightness and sense of decreased mobility of the right ankle, however she feels that this has improved since her last visit on 8/5/2020. She reports no pain or erythema.     Objective: An exam of the right lower extremity was performed today   - Right lateral and anterior lower leg with keystone flap with hypertrophic scarring and some surrounding erythema. No evidence of recurrence.    Assessment and Plan:     1) History of DFSP s/p MMS and keystone flap closure on 4/14/2020  - Discussed the nature of the diagnosis/condition  - Recommend proceeding with PDL, will hold on ILK given the softening of the scar line since her last visit.     Patient was discussed with and evaluated by attending physician Dr. Woo    Laser- VBeam(Pulsed Dye Laser) Procedure Note: Medical      Procedure Date: Sep 23, 2020    Attending Staff Surgeon: Roshan Woo    Fellow Surgeon: Jaime Ac    Operating Room Data:     Surgery/Procedure Date:    SAME     Pre-operative Diagnosis:   Hypertrophic scar  Location: Right lower extremity / ankle  Size(cm2): Approximately 12 x 10 cm  Number of lesions: 1 (ring shaped scar)    Operation/Procedure    Vbeam pulsed dye laser treatment#: 2    Post-operative  Diagnosis:  SAME    Laser Settings:  Energy: 7.0 J/cm2  Spot size: 10 mm  Pulse width:  10 mS (0.45 thru 40 mS)    Fotofinder photos: No    Anesthesia:  None    Description of Operation/Procedure:   The nature and purpose of the procedure, associated risks, possible consequences, complications and alternative methods of treatment were explained in detail, this includes but is not limited to hyperpigmentation, hypopigmentation, scarring, bruising, hair loss pain/discomfort, eye injury, and blister. We reviewed that the outcome could be any of the following: no improvement, slight improvement or change in skin color & texture, the skin might be permanently lighter or darker, and though uncommon, superficial scarring may occur.  Multiple treatments may be recommended.   A photo and operative consent were obtained. Time-out was performed.The patient was positioned to optimally expose the area treated. Protective eyewear was worn by the patient and goggles on all personnel in the treatment room. The patient confirmed the site to be treated. The laser energy output was verified by meter reading.      The clinically evident lesion(s) was/were treated with Carmela Vbeam pulsed dye laser (595 nm) beam as above. A total of 179 pulses were used. The patient tolerated the procedure well and no complications were noted. Post operative instructions were provided. The total laser operation and preparation time was 10 minutes.      The patient will follow-up in 6-8 weeks.    Dr. Woo staffed the patient was present for the entire procedure.    Staff Involved:  Staff (Chilo) and Fellow (Yashira)    Jaime Ac MD  PGY-6    Micrographic Surgery and Dermatologic Oncology Fellow  September 23, 2020        Attending attestation:  I was present for key elements of the procedure and immediately available for all other portions of the procedure.  I have reviewed the note and edited it as necessary.    Roshan Woo  M.D.  Professor  Director of Dermatologic Surgery  Department of Dermatology  [facility]    Dermatology Surgery Clinic  [facility] [address] [address]

## 2020-09-23 NOTE — PROGRESS NOTES
Micrographic Surgery Follow Up Note and Laser Treatment Note    Dermatology Surgery Clinic  [facility]  [address] [place] [id number]    Subjective: Ms. 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     Overall, the patient is doing very well. She continues to experience some tightness and sense of decreased mobility of the right ankle, however she feels that this has improved since her last visit on 8/5/2020. She reports no pain or erythema.     Objective: An exam of the right lower extremity was performed today   - Right lateral and anterior lower leg with keystone flap with hypertrophic scarring and some surrounding erythema. No evidence of recurrence.    Assessment and Plan:     1) History of DFSP s/p MMS and keystone flap closure on 4/14/2020  - Discussed the nature of the diagnosis/condition  - Recommend proceeding with PDL, will hold on ILK given the softening of the scar line since her last visit.     Patient was discussed with and evaluated by attending physician Dr. Woo    Laser- VBeam(Pulsed Dye Laser) Procedure Note: Medical      Procedure Date: Sep 23, 2020    Attending Staff Surgeon: Roshan Woo    Fellow Surgeon: Jaime Ac    Operating Room Data:     Surgery/Procedure Date:    SAME     Pre-operative Diagnosis:   Hypertrophic scar  Location: Right lower extremity / ankle  Size(cm2): Approximately 12 x 10 cm  Number of lesions: 1 (ring shaped scar)    Operation/Procedure    Vbeam pulsed dye laser treatment#: 2    Post-operative Diagnosis:  SAME    Laser Settings:  Energy: 7.0 J/cm2  Spot size: 10 mm  Pulse width:  10 mS (0.45 thru 40 mS)    Fotofinder photos: No    Anesthesia:  None    Description of Operation/Procedure:   The nature and purpose of the procedure, associated risks, possible consequences, complications  and alternative methods of treatment were explained in detail, this includes but is not limited to hyperpigmentation, hypopigmentation, scarring, bruising, hair loss pain/discomfort, eye injury, and blister. We reviewed that the outcome could be any of the following: no improvement, slight improvement or change in skin color & texture, the skin might be permanently lighter or darker, and though uncommon, superficial scarring may occur.  Multiple treatments may be recommended.   A photo and operative consent were obtained. Time-out was performed.The patient was positioned to optimally expose the area treated. Protective eyewear was worn by the patient and goggles on all personnel in the treatment room. The patient confirmed the site to be treated. The laser energy output was verified by meter reading.      The clinically evident lesion(s) was/were treated with Carmela Vbeam pulsed dye laser (595 nm) beam as above. A total of 179 pulses were used. The patient tolerated the procedure well and no complications were noted. Post operative instructions were provided. The total laser operation and preparation time was 10 minutes.      The patient will follow-up in 6-8 weeks.    Dr. Woo staffed the patient was present for the entire procedure.    Staff Involved:  Staff (Chilo) and Fellow (Yashira)    Jaime Ac MD  PGY-6    Micrographic Surgery and Dermatologic Oncology Fellow  September 23, 2020        Attending attestation:  I was present for key elements of the procedure and immediately available for all other portions of the procedure.  I have reviewed the note and edited it as necessary.    Roshan Woo M.D.  Professor  Director of Dermatologic Surgery  Department of Dermatology  [facility]    Dermatology Surgery Clinic  [facility] [place] [id number]

## 2020-09-23 NOTE — NURSING NOTE
Chief Complaint   Patient presents with     Laser Treatment     Marina is here today for PDL and kenalog injections. Reports no issues or pain.     TEODORA PIRES on 9/23/2020 at 2:00 PM      Dermatology Laser Intake Checklist:  History of psoriasis:No  History of recent tan, indoor or outdoor tanning/vacation or other sun exposure: No  History of vitiligo:No  Family history of vitiligo:No  Recent other cosmetic procedure(microderm abrasion/peel/hair removal/facial etc):No  History of HSV:No   Did the patient start valtrex: Yes, haven't had a recent dose. Has been a couple of months.  For genital laser hair removal patient only: Is there a history of genital warts or condyloma:No  Tattoo in the area to be treated:Yes  Is patient using hydroquinone:No  Retinoids and other acne medications stopped for 2 weeks:No  Has the patient had accutane in the last 6-12 months:No  Pregnant or breastfeeding: No  History of skin cancer in area planned for treatment: Yes  History of treatment with gold:No  Changes in medical history: No  Photos obtained: Yes  Does the patient smoke:Yes  Is the patient on ibuprofen/aspirin/plavix/coumadin/other blood thinner: No  If patient is taking narcotic or diazepam(valium)-does patient have : No  There were no vitals taken for this visit.

## 2020-11-11 ENCOUNTER — OFFICE VISIT (OUTPATIENT)
Dept: DERMATOLOGY | Facility: CLINIC | Age: 45
End: 2020-11-11
Payer: COMMERCIAL

## 2020-11-11 DIAGNOSIS — L91.0 HYPERTROPHIC SCAR: Primary | ICD-10-CM

## 2020-11-11 PROCEDURE — 99024 POSTOP FOLLOW-UP VISIT: CPT | Mod: GC | Performed by: DERMATOLOGY

## 2020-11-11 ASSESSMENT — PAIN SCALES - GENERAL: PAINLEVEL: NO PAIN (0)

## 2020-11-11 NOTE — LETTER
2020       RE: Marina Urbina  [address] [facility] [address]     Dear Colleague,    Thank you for referring your patient, Marina Urbina, to the [facility] DERMATOLOGIC SURGERY CLINIC [place] at [facility]. Please see a copy of my visit note below.    Laser- VBeam(Pulsed Dye Laser) Procedure Note: Medical      Procedure Date: 2020    Attending Staff Surgeon: Roshan Woo    Resident Surgeon: Jaime Ac    Assistant: GABRIEL    Operating Room Data:     Surgery/Procedure Date:    SAME     Pre-operative Diagnosis:   Hypertrophic scar  Location: Right lower extremity / ankle  Size(cm2): Approximately 12 x 10 cm  Number of lesions: 1 (ring shaped scar)    Operation/Procedure    Vbeam pulsed dye laser treatment#: 3       Post-operative Diagnosis:  SAME    Laser Settings:  Energy: 6.75 J/cm2  Spot size:10mm  Pulse width:  10 mS (0.45 thru 40 mS)  Dynamic cooling spray settin mS  Dynamic cooling device delay:  20 mS      Fotofinder photos: No    Anesthesia:  None    Description of Operation/Procedure:   The nature and purpose of the procedure, associated risks, possible consequences, complications and alternative methods of treatment were explained in detail, this includes but is not limited to hyperpigmentation, hypopigmentation, scarring, bruising, hair loss pain/discomfort, eye injury, hair loss,  and blister. We reviewed that the outcome could be any of the following: no improvement, slight improvement or change in skin color & texture, the skin might be permanently lighter or darker, and though uncommon, superficial scarring may occur.  Multiple treatments may be recommended.     A photo and operative consent were obtained. Time-out was performed.The patient was positioned to optimally expose the area treated. Protective eyewear was worn by the patient and goggles on all personnel in the treatment room. The patient confirmed the site to be  treated. The laser energy output was verified by meter reading.      The clinically evident lesion(s) was/were treated with Carmela Vbeam pulsed dye laser (595 nm) beam as above. A total of 375 pulses were used. The patient tolerated the procedure well and no complications were noted. Post operative instructions were provided. The total laser operation and preparation time was 10 minutes.  The patient was counseled to call immediately for any issues and read the after visit summary for emergency contact information.     The patient will follow-up in 4-6 weeks.    The patient will NOT pay the cosmetic fee today.     Dr. Roshan Woo staffed the patient was present for the entire procedure.    Staff Involved:  Fellow/Staff     Jaime Ac MD  PGY-6    Micrographic Surgery and Dermatologic Oncology Fellow  November 11, 2020      Attending attestation:  I was present for key elements of the procedure and immediately available for all other portions of the procedure.  I have reviewed the note and edited it as necessary.    Roshan Woo M.D.  Professor  Director of Dermatologic Surgery  Department of Dermatology  [facility]    Dermatology Surgery Clinic  [facility] Surgery [place] [phone]

## 2020-11-11 NOTE — NURSING NOTE
Chief Complaint   Patient presents with     Derm Problem     Patient is here today for laser on right lower leg.      Yessi MIGUEL CMA      Dermatology Laser Intake Checklist:  History of psoriasis:No  History of recent tan, indoor or outdoor tanning/vacation or other sun exposure: No  History of vitiligo:No  Family history of vitiligo:No  Recent other cosmetic procedure(microderm abrasion/peel/hair removal/facial etc):No  History of HSV:Yes   Did the patient start valtrex: No  For genital laser hair removal patient only: Is there a history of genital warts or condyloma:No  Tattoo in the area to be treated:No  Is patient using hydroquinone:No  Retinoids and other acne medications stopped for 2 weeks:No  Has the patient had accutane in the last 6-12 months:No  Pregnant or breastfeeding: No  History of skin cancer in area planned for treatment: Yes  History of treatment with gold:No  Changes in medical history: No  Photos obtained: Yes  Does the patient smoke:Yes  Is the patient on ibuprofen/aspirin/plavix/coumadin/other blood thinner: No  If patient is taking narcotic or diazepam(valium)-does patient have : No  There were no vitals taken for this visit.

## 2020-11-13 NOTE — PROGRESS NOTES
Laser- VBeam(Pulsed Dye Laser) Procedure Note: Medical      Procedure Date: 2020    Attending Staff Surgeon: Roshan Woo    Resident Surgeon: Jaime Ac    Assistant: GABRIEL    Operating Room Data:     Surgery/Procedure Date:    SAME     Pre-operative Diagnosis:   Hypertrophic scar  Location: Right lower extremity / ankle  Size(cm2): Approximately 12 x 10 cm  Number of lesions: 1 (ring shaped scar)    Operation/Procedure    Vbeam pulsed dye laser treatment#: 3       Post-operative Diagnosis:  SAME    Laser Settings:  Energy: 6.75 J/cm2  Spot size:10mm  Pulse width:  10 mS (0.45 thru 40 mS)  Dynamic cooling spray settin mS  Dynamic cooling device delay:  20 mS      Fotofinder photos: No    Anesthesia:  None    Description of Operation/Procedure:   The nature and purpose of the procedure, associated risks, possible consequences, complications and alternative methods of treatment were explained in detail, this includes but is not limited to hyperpigmentation, hypopigmentation, scarring, bruising, hair loss pain/discomfort, eye injury, hair loss,  and blister. We reviewed that the outcome could be any of the following: no improvement, slight improvement or change in skin color & texture, the skin might be permanently lighter or darker, and though uncommon, superficial scarring may occur.  Multiple treatments may be recommended.     A photo and operative consent were obtained. Time-out was performed.The patient was positioned to optimally expose the area treated. Protective eyewear was worn by the patient and goggles on all personnel in the treatment room. The patient confirmed the site to be treated. The laser energy output was verified by meter reading.      The clinically evident lesion(s) was/were treated with Carmela Vbeam pulsed dye laser (595 nm) beam as above. A total of 375 pulses were used. The patient tolerated the procedure well and no complications were noted. Post operative instructions were  provided. The total laser operation and preparation time was 10 minutes.  The patient was counseled to call immediately for any issues and read the after visit summary for emergency contact information.     The patient will follow-up in 4-6 weeks.    The patient will NOT pay the cosmetic fee today.     Dr. Roshan Woo staffed the patient was present for the entire procedure.    Staff Involved:  Fellow/Staff     Jaime Ac MD  PGY-6    Micrographic Surgery and Dermatologic Oncology Fellow  November 11, 2020      Attending attestation:  I was present for key elements of the procedure and immediately available for all other portions of the procedure.  I have reviewed the note and edited it as necessary.    Roshan Woo M.D.  Professor  Director of Dermatologic Surgery  Department of Dermatology  [facility]    Dermatology Surgery Clinic  [facility] and Surgery [place] [phone]

## 2021-01-04 ENCOUNTER — HEALTH MAINTENANCE LETTER (OUTPATIENT)
Age: 46
End: 2021-01-04

## 2021-01-20 ENCOUNTER — OFFICE VISIT (OUTPATIENT)
Dept: DERMATOLOGY | Facility: CLINIC | Age: 46
End: 2021-01-20
Payer: COMMERCIAL

## 2021-01-20 DIAGNOSIS — C44.792 DERMATOFIBROSARCOMA PROTUBERANS OF LOWER EXTREMITY, RIGHT: Primary | ICD-10-CM

## 2021-01-20 PROCEDURE — 99024 POSTOP FOLLOW-UP VISIT: CPT | Mod: GC | Performed by: DERMATOLOGY

## 2021-01-20 ASSESSMENT — PAIN SCALES - GENERAL: PAINLEVEL: NO PAIN (0)

## 2021-01-20 NOTE — LETTER
2021       RE: Marina Urbina  [address] [facility] [address]     Dear Colleague,    Thank you for referring your patient, Marina Urbina, to the [facility] DERMATOLOGIC SURGERY CLINIC [place] at [facility]. Please see a copy of my visit note below.    Laser- VBeam(Pulsed Dye Laser) Procedure Note: Medical        Procedure Date: 21     Attending Staff Surgeon: Roshan Woo     Resident Surgeon: Jaime Ac     Assistant: GABRIEL     Operating Room Data:     Surgery/Procedure Date:    SAME      Pre-operative Diagnosis:   Hypertrophic scar  Location: Right lower extremity / ankle  Size(cm2): Approximately 12 x 10 cm  Number of lesions: 1 (ring shaped scar)     Operation/Procedure    Vbeam pulsed dye laser treatment#: 4        Post-operative Diagnosis:  SAME   Laser Settings:  Energy: 6.00 J/cm2  Spot size:10mm  Pulse width:  10 mS (0.45 thru 40 mS)  Dynamic cooling spray settin mS  Dynamic cooling device delay:  20 mS      Fotofinder photos: No     Anesthesia:  None     Description of Operation/Procedure:   The nature and purpose of the procedure, associated risks, possible consequences, complications and alternative methods of treatment were explained in detail, this includes but is not limited to hyperpigmentation, hypopigmentation, scarring, bruising, hair loss pain/discomfort, eye injury, hair loss,  and blister. We reviewed that the outcome could be any of the following: no improvement, slight improvement or change in skin color & texture, the skin might be permanently lighter or darker, and though uncommon, superficial scarring may occur.  Multiple treatments may be recommended.      A photo and operative consent were obtained. Time-out was performed.The patient was positioned to optimally expose the area treated. Protective eyewear was worn by the patient and goggles on all personnel in the treatment room. The patient confirmed the site  to be treated. The laser energy output was verified by meter reading.       The clinically evident lesion(s) was/were treated with Carmela Vbeam pulsed dye laser (595 nm) beam as above. A total of 375 pulses were used. The patient tolerated the procedure well and no complications were noted. Post operative instructions were provided. The total laser operation and preparation time was 10 minutes.  The patient was counseled to call immediately for any issues and read the after visit summary for emergency contact information.      The patient will follow-up in 4-6 weeks.     The patient will NOT pay the cosmetic fee today.      Dr. Roshan Woo staffed the patient was present for the entire procedure.    Attending attestation:  I personally performed the entire procedure.  I have reviewed the note and edited it as necessary, and agree with its contents.    Roshan Woo M.D.  Professor  Director of Dermatologic Surgery  Department of Dermatology  [facility]    Dermatology Surgery Clinic  [facility] and Surgery [place] [phone]

## 2021-01-20 NOTE — NURSING NOTE
Chief Complaint   Patient presents with     Derm Problem     Marina is here for PDL to right lower leg.      Yessi Iverson LPN

## 2021-01-20 NOTE — NURSING NOTE
Dermatology Laser Intake Checklist:  History of psoriasis:Yes  History of recent tan, indoor or outdoor tanning/vacation or other sun exposure: No  History of vitiligo:No  Family history of vitiligo:No  Recent other cosmetic procedure(microderm abrasion/peel/hair removal/facial etc):No  History of HSV:yes   Did the patient start valtrex: Yes, as needed  For genital laser hair removal patient only: Is there a history of genital warts or condyloma:No  Tattoo in the area to be treated:Yes  Is patient using hydroquinone:No  Retinoids and other acne medications stopped for 2 weeks:No  Has the patient had accutane in the last 6-12 months:No  Pregnant or breastfeeding: No  History of skin cancer in area planned for treatment: Yes  History of treatment with gold:No  Changes in medical history: No  Photos obtained: Yes  Does the patient smoke:Yes  Is the patient on ibuprofen/aspirin/plavix/coumadin/other blood thinner: No  If patient is taking narcotic or diazepam(valium)-does patient have : No  There were no vitals taken for this visit.

## 2021-01-26 NOTE — PROGRESS NOTES
Laser- VBeam(Pulsed Dye Laser) Procedure Note: Medical        Procedure Date: 21     Attending Staff Surgeon: Roshan Woo     Resident Surgeon: Jaime Ac     Assistant: GABRIEL     Operating Room Data:     Surgery/Procedure Date:    SAME      Pre-operative Diagnosis:   Hypertrophic scar  Location: Right lower extremity / ankle  Size(cm2): Approximately 12 x 10 cm  Number of lesions: 1 (ring shaped scar)     Operation/Procedure    Vbeam pulsed dye laser treatment#: 4        Post-operative Diagnosis:  SAME     Laser Settings:  Energy: 6.00 J/cm2  Spot size:10mm  Pulse width:  10 mS (0.45 thru 40 mS)  Dynamic cooling spray settin mS  Dynamic cooling device delay:  20 mS        Fotofinder photos: No     Anesthesia:  None     Description of Operation/Procedure:   The nature and purpose of the procedure, associated risks, possible consequences, complications and alternative methods of treatment were explained in detail, this includes but is not limited to hyperpigmentation, hypopigmentation, scarring, bruising, hair loss pain/discomfort, eye injury, hair loss,  and blister. We reviewed that the outcome could be any of the following: no improvement, slight improvement or change in skin color & texture, the skin might be permanently lighter or darker, and though uncommon, superficial scarring may occur.  Multiple treatments may be recommended.      A photo and operative consent were obtained. Time-out was performed.The patient was positioned to optimally expose the area treated. Protective eyewear was worn by the patient and goggles on all personnel in the treatment room. The patient confirmed the site to be treated. The laser energy output was verified by meter reading.       The clinically evident lesion(s) was/were treated with Carmela Vbeam pulsed dye laser (595 nm) beam as above. A total of 375 pulses were used. The patient tolerated the procedure well and no complications were noted. Post operative  instructions were provided. The total laser operation and preparation time was 10 minutes.  The patient was counseled to call immediately for any issues and read the after visit summary for emergency contact information.      The patient will follow-up in 4-6 weeks.     The patient will NOT pay the cosmetic fee today.      Dr. Roshan Woo staffed the patient was present for the entire procedure.    Attending attestation:  I personally performed the entire procedure.  I have reviewed the note and edited it as necessary, and agree with its contents.    Roshan Woo M.D.  Professor  Director of Dermatologic Surgery  Department of Dermatology  [facility]    Dermatology Surgery Clinic  [facility]  [address] [place], [place] [id number]

## 2021-04-06 ENCOUNTER — VIRTUAL VISIT (OUTPATIENT)
Dept: DERMATOLOGY | Facility: CLINIC | Age: 46
End: 2021-04-06
Payer: COMMERCIAL

## 2021-04-06 DIAGNOSIS — C44.792 DERMATOFIBROSARCOMA PROTUBERANS OF LOWER EXTREMITY, RIGHT: Primary | ICD-10-CM

## 2021-04-06 PROCEDURE — 99024 POSTOP FOLLOW-UP VISIT: CPT | Mod: 95 | Performed by: DERMATOLOGY

## 2021-04-06 ASSESSMENT — PAIN SCALES - GENERAL: PAINLEVEL: MODERATE PAIN (5)

## 2021-04-06 NOTE — PROGRESS NOTES
[facility] Dermatology Note  Encounter Date: Apr 6, 2021  Store-and-Forward and Telephone ([phone]). Location of teledermatologist: [facility] DERMATOLOGIC SURGERY CLINIC [place].     Elizabeth presents for follow up.  Some redness and stiffness of scar.    Bring her in for PDL +/- CO2    Roshan Woo MD

## 2021-04-06 NOTE — PATIENT INSTRUCTIONS
[facility] Dermatology Visit    Thank you for allowing us to participate in your care. Your findings, instructions and follow-up plan are as follows:         When should I call my doctor?    If you are worsening or not improving, please, contact us or seek urgent care as noted below.     Who should I call with questions (adults)?    [facility] (adult and pediatric): [phone]     [facility] (adult): [phone]    For urgent needs outside of business hours call the [facility] at [phone] and ask for the dermatology resident on call    If this is a medical emergency and you are unable to reach an ER, Call 911      Who should I call with questions (pediatric)?  [facility]- Pediatric Dermatology  Dr. Anushka Hernandez, Dr. Tiffany Steen, Dr. Lilia Pate, Kristi Squires, PA  Dr. Marina Be, Dr. Radha Stoll & Dr. Toñito Hairston  Non Urgent  Nurse Triage Line; [phone]- Mitra and Kate RN Care Coordinators   Yasmine (/Complex ) [phone]    If you need a prescription refill, please contact your pharmacy. Refills are approved or denied by our Physicians during normal business hours, Monday through Fridays  Per office policy, refills will not be granted if you have not been seen within the past year (or sooner depending on your child's condition)    Scheduling Information:  Pediatric Appointment Scheduling and Call Center [phone]  Radiology Scheduling- [phone]  Sedation Unit Scheduling- [phone]  [place] Scheduling- General [phone]; Pediatric Dermatology [phone]  Main  Services: [phone]  Lao: [phone]  Kosovan: [phone]  Hmong/Swedish/Slovak: [phone]  Preadmission Nursing Department Fax Number: [phone] (Fax all pre-operative paperwork to this number)    For urgent matters arising during evenings,  weekends, or holidays that cannot wait for normal business hours please call [phone] and ask for the Dermatology Resident On-Call to be paged.

## 2021-04-06 NOTE — NURSING NOTE
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 She states she has been having nerve pain at the bottom of her foot.     Chester Alas, [facility]

## 2021-04-06 NOTE — LETTER
4/6/2021       RE: Marina Urbina  [address] [facility] [address]     Dear Colleague,    Thank you for referring your patient, Marian Urbina, to the [facility] DERMATOLOGIC SURGERY CLINIC [place] at [facility]. Please see a copy of my visit note below.    [facility] Dermatology Note  Encounter Date: Apr 6, 2021  Store-and-Forward and Telephone ([phone]). Location of teledermatologist: [facility] DERMATOLOGIC SURGERY CLINIC [place].     Elizabeth presents for follow up.  Some redness and stiffness of scar.    Bring her in for PDL +/- CO2    Roshan Woo MD

## 2021-05-18 ENCOUNTER — OFFICE VISIT (OUTPATIENT)
Dept: DERMATOLOGY | Facility: CLINIC | Age: 46
End: 2021-05-18
Payer: COMMERCIAL

## 2021-05-18 DIAGNOSIS — C44.792 DERMATOFIBROSARCOMA PROTUBERANS OF RIGHT LOWER EXTREMITY: Primary | ICD-10-CM

## 2021-05-18 PROCEDURE — 99212 OFFICE O/P EST SF 10 MIN: CPT | Mod: GC | Performed by: DERMATOLOGY

## 2021-05-18 ASSESSMENT — PAIN SCALES - GENERAL: PAINLEVEL: NO PAIN (0)

## 2021-05-18 NOTE — PROGRESS NOTES
Dermatology Laser Intake Checklist:  History of psoriasis:Yes  History of recent tan, indoor or outdoor tanning/vacation or other sun exposure: No  History of vitiligo:No  Family history of vitiligo:No  Recent other cosmetic procedure(microderm abrasion/peel/hair removal/facial etc):No  History of HSV:yes   Did the patient start valtrex: Yes, as needed  For genital laser hair removal patient only: Is there a history of genital warts or condyloma:No  Tattoo in the area to be treated:Yes  Is patient using hydroquinone:No  Retinoids and other acne medications stopped for 2 weeks:No  Has the patient had accutane in the last 6-12 months:No  Pregnant or breastfeeding: No  History of skin cancer in area planned for treatment: Yes  History of treatment with gold:No  Changes in medical history: No  Photos obtained: Yes  Does the patient smoke:Yes  Is the patient on ibuprofen/aspirin/plavix/coumadin/other blood thinner: No  If patient is taking narcotic or diazepam(valium)-does patient have : No  There were no vitals taken for this visit.    Laser- VBeam(Pulsed Dye Laser) Procedure Note: Medical        Procedure Date: 21     Attending Staff Surgeon: Roshan Woo     Resident Surgeon: Jaime Ac     Assistant: GABRIEL     Operating Room Data:     Surgery/Procedure Date:    SAME      Pre-operative Diagnosis:   Hypertrophic scar  Location: Right lower extremity / ankle  Size(cm2): Approximately 12 x 10 cm  Number of lesions: 1 (ring shaped scar)     Operation/Procedure    Vbeam pulsed dye laser treatment#: 4        Post-operative Diagnosis:  SAME     Laser Settings:  Energy: 6.5 J/cm2  Spot size:12mm  Pulse width:  10 mS (0.45 thru 40 mS)  Dynamic cooling spray settin mS  Dynamic cooling device delay:  20 mS        Fotofinder photos: No     Anesthesia:  None     Description of Operation/Procedure:   The nature and purpose of the procedure, associated risks, possible consequences, complications and alternative methods  of treatment were explained in detail, this includes but is not limited to hyperpigmentation, hypopigmentation, scarring, bruising, hair loss pain/discomfort, eye injury, hair loss,  and blister. We reviewed that the outcome could be any of the following: no improvement, slight improvement or change in skin color & texture, the skin might be permanently lighter or darker, and though uncommon, superficial scarring may occur.  Multiple treatments may be recommended.      A photo and operative consent were obtained. Time-out was performed.The patient was positioned to optimally expose the area treated. Protective eyewear was worn by the patient and goggles on all personnel in the treatment room. The patient confirmed the site to be treated. The laser energy output was verified by meter reading.       The clinically evident lesion(s) was/were treated with Carmela Vbeam pulsed dye laser (595 nm) beam as above. A total of 150 pulses were used. The patient tolerated the procedure well and no complications were noted. Post operative instructions were provided. The total laser operation and preparation time was 10 minutes.  The patient was counseled to call immediately for any issues and read the after visit summary for emergency contact information.      The patient will follow-up in 8-12 weeks.     The patient will NOT pay the cosmetic fee today.      Dr. Roshan Woo staffed the patient was present for the entire procedure.     Attending attestation:  I personally performed the entire procedure.  I have reviewed the note and edited it as necessary, and agree with its contents.   Pt is complaining of numbness of lateral foot likely due to nerve disruption/neuropraxia from procedure.  May be worsening due to scar hypertrophy and pressure.  Will monitor.  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  Consider MRI if worsens.    Roshan Woo M.D.  Professor  Director of Dermatologic Surgery  Department of  Dermatology  [facility]     Dermatology Surgery Clinic  [facility]  [address] [place] [id number]

## 2021-05-18 NOTE — NURSING NOTE
Drug Administration Record    Prior to injection, verified patient identity using patient's name and date of birth.  Due to injection administration, patient instructed to remain in clinic for 15 minutes  afterwards, and to report any adverse reaction to me immediately.    Drug Name: triamcinolone acetonide(kenalog)  Dose: 1mL of triamcinolone 40mg/mL, 40mg dose  Route administered: ID  NDC #: Kenalog-40 ([address]-6)  Amount of waste(mL):0  Reason for waste: Single use vial    LOT #: [id number]  SITE: see note  : amneal  EXPIRATION DATE: 11/2022

## 2021-05-18 NOTE — LETTER
5/18/2021       RE: Marina Urbina  [address] [facility] [address]     Dear Colleague,    Thank you for referring your patient, Marina Urbina, to the [facility] DERMATOLOGIC SURGERY CLINIC [place] at [facility]. Please see a copy of my visit note below.    Dermatology Laser Intake Checklist:  History of psoriasis:Yes  History of recent tan, indoor or outdoor tanning/vacation or other sun exposure: No  History of vitiligo:No  Family history of vitiligo:No  Recent other cosmetic procedure(microderm abrasion/peel/hair removal/facial etc):No  History of HSV:yes   Did the patient start valtrex: Yes, as needed  For genital laser hair removal patient only: Is there a history of genital warts or condyloma:No  Tattoo in the area to be treated:Yes  Is patient using hydroquinone:No  Retinoids and other acne medications stopped for 2 weeks:No  Has the patient had accutane in the last 6-12 months:No  Pregnant or breastfeeding: No  History of skin cancer in area planned for treatment: Yes  History of treatment with gold:No  Changes in medical history: No  Photos obtained: Yes  Does the patient smoke:Yes  Is the patient on ibuprofen/aspirin/plavix/coumadin/other blood thinner: No  If patient is taking narcotic or diazepam(valium)-does patient have : No  There were no vitals taken for this visit.    Laser- VBeam(Pulsed Dye Laser) Procedure Note: Medical        Procedure Date: 5/19/21     Attending Staff Surgeon: Roshan Woo     Resident Surgeon: Jaime Ac     Assistant: GABRIEL     Operating Room Data:     Surgery/Procedure Date:    SAME      Pre-operative Diagnosis:   Hypertrophic scar  Location: Right lower extremity / ankle  Size(cm2): Approximately 12 x 10 cm  Number of lesions: 1 (ring shaped scar)     Operation/Procedure    Vbeam pulsed dye laser treatment#: 4        Post-operative Diagnosis:  SAME     Laser Settings:  Energy: 6.5 J/cm2  Spot  size:12mm  Pulse width:  10 mS (0.45 thru 40 mS)  Dynamic cooling spray settin mS  Dynamic cooling device delay:  20 mS        Fotofinder photos: No     Anesthesia:  None     Description of Operation/Procedure:   The nature and purpose of the procedure, associated risks, possible consequences, complications and alternative methods of treatment were explained in detail, this includes but is not limited to hyperpigmentation, hypopigmentation, scarring, bruising, hair loss pain/discomfort, eye injury, hair loss,  and blister. We reviewed that the outcome could be any of the following: no improvement, slight improvement or change in skin color & texture, the skin might be permanently lighter or darker, and though uncommon, superficial scarring may occur.  Multiple treatments may be recommended.      A photo and operative consent were obtained. Time-out was performed.The patient was positioned to optimally expose the area treated. Protective eyewear was worn by the patient and goggles on all personnel in the treatment room. The patient confirmed the site to be treated. The laser energy output was verified by meter reading.       The clinically evident lesion(s) was/were treated with Carmela Vbeam pulsed dye laser (595 nm) beam as above. A total of 150 pulses were used. The patient tolerated the procedure well and no complications were noted. Post operative instructions were provided. The total laser operation and preparation time was 10 minutes.  The patient was counseled to call immediately for any issues and read the after visit summary for emergency contact information.      The patient will follow-up in 8-12 weeks.     The patient will NOT pay the cosmetic fee today.      Dr. Roshan Woo staffed the patient was present for the entire procedure.     Attending attestation:  I personally performed the entire procedure.  I have reviewed the note and edited it as necessary, and agree with its contents.   Pt is  complaining of numbness of lateral foot likely due to nerve disruption/neuropraxia from procedure.  May be worsening due to scar hypertrophy and pressure.  Will monitor.  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  Consider MRI if worsens.    Roshan Woo M.D.  Professor  Director of Dermatologic Surgery  Department of Dermatology  [facility]     Dermatology Surgery Clinic  [facility] [place] [id number]

## 2021-05-18 NOTE — NURSING NOTE
Chief Complaint   Patient presents with     Derm Problem     R lower leg     Shahla Casarez, EMT

## 2021-07-07 ENCOUNTER — TELEPHONE (OUTPATIENT)
Dept: DERMATOLOGY | Facility: CLINIC | Age: 46
End: 2021-07-07

## 2021-07-07 ENCOUNTER — OFFICE VISIT (OUTPATIENT)
Dept: DERMATOLOGY | Facility: CLINIC | Age: 46
End: 2021-07-07
Payer: COMMERCIAL

## 2021-07-07 DIAGNOSIS — C44.792 DERMATOFIBROSARCOMA PROTUBERANS OF RIGHT LOWER EXTREMITY: Primary | ICD-10-CM

## 2021-07-07 PROCEDURE — 99024 POSTOP FOLLOW-UP VISIT: CPT | Mod: GC | Performed by: DERMATOLOGY

## 2021-07-07 RX ORDER — LOSARTAN POTASSIUM 100 MG/1
100 TABLET ORAL
COMMUNITY
Start: 2021-07-02 | End: 2022-07-02

## 2021-07-07 RX ORDER — BACLOFEN 10 MG/1
10-20 TABLET ORAL
COMMUNITY
End: 2021-11-10

## 2021-07-07 RX ORDER — ESTRADIOL 0.1 MG/G
CREAM VAGINAL
COMMUNITY
Start: 2021-07-02

## 2021-07-07 RX ORDER — ROPINIROLE 0.25 MG/1
TABLET, FILM COATED ORAL
COMMUNITY
Start: 2021-06-11

## 2021-07-07 RX ORDER — PAROXETINE 10 MG/1
TABLET, FILM COATED ORAL
COMMUNITY
Start: 2021-02-17

## 2021-07-07 RX ORDER — CETIRIZINE HYDROCHLORIDE 10 MG/1
10 TABLET ORAL
COMMUNITY
Start: 2021-07-02

## 2021-07-07 RX ORDER — VALACYCLOVIR HYDROCHLORIDE 1 G/1
TABLET, FILM COATED ORAL
COMMUNITY
Start: 2021-01-18

## 2021-07-07 RX ORDER — CYCLOBENZAPRINE HCL 10 MG
10 TABLET ORAL
COMMUNITY
Start: 2020-11-02 | End: 2021-11-02

## 2021-07-07 RX ORDER — ATENOLOL 100 MG/1
100 TABLET ORAL
COMMUNITY
Start: 2021-07-02 | End: 2022-07-02

## 2021-07-07 RX ORDER — MONTELUKAST SODIUM 10 MG/1
10 TABLET ORAL
COMMUNITY
Start: 2021-07-02 | End: 2022-07-02

## 2021-07-07 RX ORDER — AMLODIPINE BESYLATE 5 MG/1
5 TABLET ORAL
COMMUNITY
Start: 2021-07-02 | End: 2022-07-02

## 2021-07-07 NOTE — PROGRESS NOTES
Dermatology Laser Intake Checklist:  History of psoriasis:No  History of recent tan, indoor or outdoor tanning/vacation or other sun exposure: No  History of vitiligo:No  Family history of vitiligo:No  Recent other cosmetic procedure(microderm abrasion/peel/hair removal/facial etc):No  History of HSV:Yes   Did the patient start valtrex: No  For genital laser hair removal patient only: Is there a history of genital warts or condyloma:No  Tattoo in the area to be treated:No  Is patient using hydroquinone:No  Retinoids and other acne medications stopped for 2 weeks:No  Has the patient had accutane in the last 6-12 months:No  Pregnant or breastfeeding: No  History of skin cancer in area planned for treatment: Yes  History of treatment with gold:No  Changes in medical history: No  Photos obtained: Yes  Does the patient smoke: Yes   Is the patient on ibuprofen/aspirin/plavix/coumadin/other blood thinner: No  If patient is taking narcotic or diazepam(valium)-does patient have : No  There were no vitals taken for this visit.

## 2021-07-07 NOTE — TELEPHONE ENCOUNTER
Orders for MRI sent to [facility] at fax # [phone]. Call back number given for further questions/concerns.

## 2021-07-07 NOTE — LETTER
7/7/2021       RE: Marina Urbina  [address] [facility] [address]     Dear Colleague,    Thank you for referring your patient, Marina Urbina, to the [facility] DERMATOLOGIC SURGERY CLINIC [place] at [facility]. Please see a copy of my visit note below.    Dermatology Laser Intake Checklist:  History of psoriasis:No  History of recent tan, indoor or outdoor tanning/vacation or other sun exposure: No  History of vitiligo:No  Family history of vitiligo:No  Recent other cosmetic procedure(microderm abrasion/peel/hair removal/facial etc):No  History of HSV:Yes   Did the patient start valtrex: No  For genital laser hair removal patient only: Is there a history of genital warts or condyloma:No  Tattoo in the area to be treated:No  Is patient using hydroquinone:No  Retinoids and other acne medications stopped for 2 weeks:No  Has the patient had accutane in the last 6-12 months:No  Pregnant or breastfeeding: No  History of skin cancer in area planned for treatment: Yes  History of treatment with gold:No  Changes in medical history: No  Photos obtained: Yes  Does the patient smoke: Yes   Is the patient on ibuprofen/aspirin/plavix/coumadin/other blood thinner: No  If patient is taking narcotic or diazepam(valium)-does patient have : No  There were no vitals taken for this visit.      Dermatology Laser Intake Checklist:  History of psoriasis:Yes  History of recent tan, indoor or outdoor tanning/vacation or other sun exposure: No  History of vitiligo:No  Family history of vitiligo:No  Recent other cosmetic procedure(microderm abrasion/peel/hair removal/facial etc):No  History of HSV:yes   Did the patient start valtrex: Yes, as needed  For genital laser hair removal patient only: Is there a history of genital warts or condyloma:No  Tattoo in the area to be treated:Yes  Is patient using hydroquinone:No  Retinoids and other acne medications stopped for  2 weeks:No  Has the patient had accutane in the last 6-12 months:No  Pregnant or breastfeeding: No  History of skin cancer in area planned for treatment: Yes  History of treatment with gold:No  Changes in medical history: No  Photos obtained: Yes  Does the patient smoke:Yes  Is the patient on ibuprofen/aspirin/plavix/coumadin/other blood thinner: No  If patient is taking narcotic or diazepam(valium)-does patient have : No  There were no vitals taken for this visit.    Laser- VBeam(Pulsed Dye Laser) Procedure Note: Medical        Procedure Date: 2021     Attending Staff Surgeon: Dr. Roshan Woo     Fellow Surgeon: Dr. Dionisio Finnegan     Assistant: N/A     Operating Room Data:     Surgery/Procedure Date:    SAME      Pre-operative Diagnosis:   Hypertrophic scar  Location: Right lower extremity / ankle  Size(cm2): Approximately 12 x 10 cm  Number of lesions: 1 (ring shaped scar)     Operation/Procedure    Vbeam pulsed dye laser treatment #: 5        Post-operative Diagnosis:  SAME     Laser Settings:  Energy: 6.5 J/cm2  Spot size:12mm  Pulse width:  10 mS (0.45 thru 40 mS)  Dynamic cooling spray settin mS  Dynamic cooling device delay:  20 mS        Fotofinder photos: No     Anesthesia:  None     Description of Operation/Procedure:   The nature and purpose of the procedure, associated risks, possible consequences, complications and alternative methods of treatment were explained in detail, this includes but is not limited to hyperpigmentation, hypopigmentation, scarring, bruising, hair loss pain/discomfort, eye injury, hair loss,  and blister. We reviewed that the outcome could be any of the following: no improvement, slight improvement or change in skin color & texture, the skin might be permanently lighter or darker, and though uncommon, superficial scarring may occur.  Multiple treatments may be recommended.      A photo and operative consent were obtained. Time-out was performed.The patient was positioned  to optimally expose the area treated. Protective eyewear was worn by the patient and goggles on all personnel in the treatment room. The patient confirmed the site to be treated. The laser energy output was verified by meter reading.       The clinically evident lesion(s) was/were treated with Carmela Vbeam pulsed dye laser (595 nm) beam as above. A total of 150 pulses were used. The patient tolerated the procedure well and no complications were noted. Post operative instructions were provided. The total laser operation and preparation time was 10 minutes.  The patient was counseled to call immediately for any issues and read the after visit summary for emergency contact information.      The patient will follow-up in 4-6 weeks.     The patient will NOT pay the cosmetic fee today.      Dr. Roshan Woo staffed the patient was present for the entire procedure.    Attestation signed by Roshan Woo MD at 7/19/2021  1:46 PM:    Attending attestation:  I was present for key elements of the procedure and immediately available for all other portions of the procedure.  I have reviewed the note and edited it as necessary.    Roshan Woo M.D.  Professor  Director of Dermatologic Surgery  Department of Dermatology  [facility]    Dermatology Surgery Clinic  [facility]  [address] [place] [address]

## 2021-07-08 NOTE — PROGRESS NOTES
Dermatology Laser Intake Checklist:  History of psoriasis:Yes  History of recent tan, indoor or outdoor tanning/vacation or other sun exposure: No  History of vitiligo:No  Family history of vitiligo:No  Recent other cosmetic procedure(microderm abrasion/peel/hair removal/facial etc):No  History of HSV:yes   Did the patient start valtrex: Yes, as needed  For genital laser hair removal patient only: Is there a history of genital warts or condyloma:No  Tattoo in the area to be treated:Yes  Is patient using hydroquinone:No  Retinoids and other acne medications stopped for 2 weeks:No  Has the patient had accutane in the last 6-12 months:No  Pregnant or breastfeeding: No  History of skin cancer in area planned for treatment: Yes  History of treatment with gold:No  Changes in medical history: No  Photos obtained: Yes  Does the patient smoke:Yes  Is the patient on ibuprofen/aspirin/plavix/coumadin/other blood thinner: No  If patient is taking narcotic or diazepam(valium)-does patient have : No  There were no vitals taken for this visit.    Laser- VBeam(Pulsed Dye Laser) Procedure Note: Medical        Procedure Date: 2021     Attending Staff Surgeon: Dr. Roshan Woo     Fellow Surgeon: Dr. Dionisio Finnegan     Assistant: N/A     Operating Room Data:     Surgery/Procedure Date:    SAME      Pre-operative Diagnosis:   Hypertrophic scar  Location: Right lower extremity / ankle  Size(cm2): Approximately 12 x 10 cm  Number of lesions: 1 (ring shaped scar)     Operation/Procedure    Vbeam pulsed dye laser treatment #: 5        Post-operative Diagnosis:  SAME     Laser Settings:  Energy: 6.5 J/cm2  Spot size:12mm  Pulse width:  10 mS (0.45 thru 40 mS)  Dynamic cooling spray settin mS  Dynamic cooling device delay:  20 mS        Fotofinder photos: No     Anesthesia:  None     Description of Operation/Procedure:   The nature and purpose of the procedure, associated risks, possible consequences, complications and alternative  methods of treatment were explained in detail, this includes but is not limited to hyperpigmentation, hypopigmentation, scarring, bruising, hair loss pain/discomfort, eye injury, hair loss,  and blister. We reviewed that the outcome could be any of the following: no improvement, slight improvement or change in skin color & texture, the skin might be permanently lighter or darker, and though uncommon, superficial scarring may occur.  Multiple treatments may be recommended.      A photo and operative consent were obtained. Time-out was performed.The patient was positioned to optimally expose the area treated. Protective eyewear was worn by the patient and goggles on all personnel in the treatment room. The patient confirmed the site to be treated. The laser energy output was verified by meter reading.       The clinically evident lesion(s) was/were treated with Carmela Vbeam pulsed dye laser (595 nm) beam as above. A total of 150 pulses were used. The patient tolerated the procedure well and no complications were noted. Post operative instructions were provided. The total laser operation and preparation time was 10 minutes.  The patient was counseled to call immediately for any issues and read the after visit summary for emergency contact information.      The patient will follow-up in 4-6 weeks.     The patient will NOT pay the cosmetic fee today.      Dr. Roshan Woo staffed the patient was present for the entire procedure.

## 2021-07-16 ENCOUNTER — TRANSFERRED RECORDS (OUTPATIENT)
Dept: HEALTH INFORMATION MANAGEMENT | Facility: CLINIC | Age: 46
End: 2021-07-16

## 2021-07-16 LAB — NEGATIVE: NORMAL

## 2021-08-18 ENCOUNTER — OFFICE VISIT (OUTPATIENT)
Dept: DERMATOLOGY | Facility: CLINIC | Age: 46
End: 2021-08-18
Payer: COMMERCIAL

## 2021-08-18 DIAGNOSIS — L91.0 HYPERTROPHIC SCAR: Primary | ICD-10-CM

## 2021-08-18 DIAGNOSIS — C44.792 DERMATOFIBROSARCOMA PROTUBERANS OF RIGHT LOWER EXTREMITY: ICD-10-CM

## 2021-08-18 PROCEDURE — 99024 POSTOP FOLLOW-UP VISIT: CPT | Mod: GC | Performed by: DERMATOLOGY

## 2021-08-18 ASSESSMENT — PAIN SCALES - GENERAL: PAINLEVEL: NO PAIN (0)

## 2021-08-18 NOTE — PROGRESS NOTES
Laser - Vbeam (Pulsed Dye Laser, PDL) Procedure Note: Medical    Procedure Date: Aug 18, 2021    Attending Staff Surgeon: Roshan Woo MD    Fellow Surgeon: Dionisio Finnegan MD    Resident Surgeon: N/a    Assistant: Alta Farnsworth RN    Operating Room Data:     Surgery/Procedure Date:    SAME     Pre-operative Diagnosis:   Scar  Location: Right lower extremity / ankle  Size (cm2): Approximately 12 x 10 cm  Number of lesions: 1 (ring shaped scar)    Operation/Procedure    Vbeam pulsed dye laser treatment #: 6     Post-operative Diagnosis:  SAME    Laser Settings:  Energy: 6.5 J/cm2  Spot size: 12mm  Pulse width: 10 mS (0.45 thru 40 mS)  Dynamic cooling spray settin mS  Dynamic cooling device delay:  20 mS  Total pulses used: 84    Fotofinder photos: No    Anesthesia:  None    Description of Operation/Procedure:   The nature and purpose of the procedure, associated risks, possible consequences, complications and alternative methods of treatment were explained in detail, this includes but is not limited to hyperpigmentation, hypopigmentation, scarring, bruising, hair loss pain/discomfort, eye injury, hair loss,  and blister. We reviewed that the outcome could be any of the following: no improvement, slight improvement or change in skin color & texture, the skin might be permanently lighter or darker, and though uncommon, superficial scarring may occur.  Multiple treatments may be recommended.    A photo and operative consent were obtained. Time-out was performed.The patient was positioned to optimally expose the area treated. Protective eyewear was worn by the patient and goggles on all personnel in the treatment room. The patient confirmed the site to be treated. The laser energy output was verified by meter reading.      The clinically evident lesion(s) was/were treated with Carmela Vbeam pulsed dye laser (595 nm) beam as above. Total of pulses used is recorded above under laser settings. The patient tolerated the  procedure well and no complications were noted. Post operative instructions were provided. The total laser operation and preparation time was 10 minutes.  The patient was counseled to call immediately for any issues and read the after visit summary for emergency contact information. The patient was counseled that MyChart messaging response may be delayed by several days, therefore, phone is preferred for emergency issues.     The patient will follow-up in 4-6 weeks.    The patient will NOT pay the cosmetic fee today.     Roshan Woo MD staffed the patient was present for identifying and marking target area(s),    Staff Involved:  Fellow/Attending Staff (as above)    Tsering Escalante MD  Resident    Scribe Disclosure:  I, Halle Lew, am serving as a scribe to document services personally performed by Roshan Woo MD based on data collection and the provider's statements to me.       Attending attestation:  I was present for key elements of the procedure and immediately available for all other portions of the procedure.  I have reviewed the note and edited it as necessary.    Roshan Woo M.D.  Professor  Director of Dermatologic Surgery  Department of Dermatology  [facility]    Dermatology Surgery Clinic  [facility]  [address] [place] [id number]

## 2021-08-18 NOTE — LETTER
2021       RE: Marina Urbina  [address] [facility] [address]     Dear Colleague,    Thank you for referring your patient, Marina Urbina, to the [facility] DERMATOLOGIC SURGERY CLINIC [place] at [facility]. Please see a copy of my visit note below.    Laser - Vbeam (Pulsed Dye Laser, PDL) Procedure Note: Medical    Procedure Date: Aug 18, 2021    Attending Staff Surgeon: Roshan Woo MD    Fellow Surgeon: Dionisio Finnegan MD    Resident Surgeon: N/a    Assistant: Alta Farnsworth RN    Operating Room Data:     Surgery/Procedure Date:    SAME     Pre-operative Diagnosis:   Scar  Location: Right lower extremity / ankle  Size (cm2): Approximately 12 x 10 cm  Number of lesions: 1 (ring shaped scar)    Operation/Procedure    Vbeam pulsed dye laser treatment #: 6     Post-operative Diagnosis:  SAME    Laser Settings:  Energy: 6.5 J/cm2  Spot size: 12mm  Pulse width: 10 mS (0.45 thru 40 mS)  Dynamic cooling spray settin mS  Dynamic cooling device delay:  20 mS  Total pulses used: 84    Fotofinder photos: No    Anesthesia:  None    Description of Operation/Procedure:   The nature and purpose of the procedure, associated risks, possible consequences, complications and alternative methods of treatment were explained in detail, this includes but is not limited to hyperpigmentation, hypopigmentation, scarring, bruising, hair loss pain/discomfort, eye injury, hair loss,  and blister. We reviewed that the outcome could be any of the following: no improvement, slight improvement or change in skin color & texture, the skin might be permanently lighter or darker, and though uncommon, superficial scarring may occur.  Multiple treatments may be recommended.    A photo and operative consent were obtained. Time-out was performed.The patient was positioned to optimally expose the area treated. Protective eyewear was worn by the patient and goggles on all  personnel in the treatment room. The patient confirmed the site to be treated. The laser energy output was verified by meter reading.      The clinically evident lesion(s) was/were treated with Carmela Vbeam pulsed dye laser (595 nm) beam as above. Total of pulses used is recorded above under laser settings. The patient tolerated the procedure well and no complications were noted. Post operative instructions were provided. The total laser operation and preparation time was 10 minutes.  The patient was counseled to call immediately for any issues and read the after visit summary for emergency contact information. The patient was counseled that [email or web address] messaging response may be delayed by several days, therefore, phone is preferred for emergency issues.     The patient will follow-up in 4-6 weeks.    The patient will NOT pay the cosmetic fee today.     Roshan Woo MD staffed the patient was present for identifying and marking target area(s),    Staff Involved:  Fellow/Attending Staff (as above)    Tsering Escalante MD  Resident    Scribe Disclosure:  I, Halle Lew, am serving as a scribe to document services personally performed by Roshan Woo MD based on data collection and the provider's statements to me.       Attending attestation:  I was present for key elements of the procedure and immediately available for all other portions of the procedure.  I have reviewed the note and edited it as necessary.    Roshan Woo M.D.  Professor  Director of Dermatologic Surgery  Department of Dermatology  [facility]    Dermatology Surgery Clinic  [facility] [address] [address]

## 2021-08-18 NOTE — NURSING NOTE
Chief Complaint   Patient presents with     Derm Problem     PDL Right lower leg     Shahla Casarez, EMT

## 2021-09-22 ENCOUNTER — TELEPHONE (OUTPATIENT)
Dept: DERMATOLOGY | Facility: CLINIC | Age: 46
End: 2021-09-22

## 2021-09-22 NOTE — TELEPHONE ENCOUNTER
Called and left message for patient to return call and reschedule appointment.    Daniel Luke, Procedure

## 2021-10-10 ENCOUNTER — HEALTH MAINTENANCE LETTER (OUTPATIENT)
Age: 46
End: 2021-10-10

## 2021-11-10 ENCOUNTER — OFFICE VISIT (OUTPATIENT)
Dept: DERMATOLOGY | Facility: CLINIC | Age: 46
End: 2021-11-10
Payer: COMMERCIAL

## 2021-11-10 DIAGNOSIS — L91.0 HYPERTROPHIC SCAR: Primary | ICD-10-CM

## 2021-11-10 PROCEDURE — 99024 POSTOP FOLLOW-UP VISIT: CPT | Mod: GC | Performed by: DERMATOLOGY

## 2021-11-10 RX ORDER — PAROXETINE HYDROCHLORIDE HEMIHYDRATE 25 MG/1
TABLET, FILM COATED, EXTENDED RELEASE ORAL
COMMUNITY
Start: 2021-10-01

## 2021-11-10 RX ORDER — VALACYCLOVIR HYDROCHLORIDE 1 G/1
2000 TABLET, FILM COATED ORAL
COMMUNITY
Start: 2021-09-04

## 2021-11-10 NOTE — LETTER
11/10/2021       RE: Marina Urbina  [address] [facility] [address]     Dear Colleague,    Thank you for referring your patient, Marina Urbina, to the [facility] DERMATOLOGIC SURGERY CLINIC [place] at [facility]. Please see a copy of my visit note below.    Laser - Vbeam (Pulsed Dye Laser, PDL) Procedure Note: Medical    Procedure Date: Nov 10, 2021    Attending Staff Surgeon: Roshan Woo MD    Fellow Surgeon: Dionisio Finnegan MD    Resident Surgeon: Rosa Esposito MD    Assistant: Yessi Wells CMA    Operating Room Data:     Surgery/Procedure Date:    SAME     Pre-operative Diagnosis:   Scar  Location: right lower extremity/ankle  Size (cm2): 12 x 10 cm  Number of lesions: 1 (ring shaped scar)     Operation/Procedure    Vbeam pulsed dye laser treatment #: 7    Post-operative Diagnosis:  SAME    Laser Settings:  Energy: 6.5 J/cm2  Spot size: 12 mm  Pulse width: 10 mS (0.45 thru 40 mS)  Dynamic cooling spray settin mS  Dynamic cooling device delay:  20 mS  Total pulses used: 95    Fotofinder photos: No    Anesthesia:  None    Description of Operation/Procedure:   The nature and purpose of the procedure, associated risks, possible consequences, complications and alternative methods of treatment were explained in detail, this includes but is not limited to hyperpigmentation, hypopigmentation, scarring, bruising, hair loss pain/discomfort, eye injury, hair loss,  and blister. We reviewed that the outcome could be any of the following: no improvement, slight improvement or change in skin color & texture, the skin might be permanently lighter or darker, and though uncommon, superficial scarring may occur.  Multiple treatments may be recommended.    A photo and operative consent were obtained. Time-out was performed.The patient was positioned to optimally expose the area treated. Protective eyewear was worn by the patient and goggles on all  personnel in the treatment room. The patient confirmed the site to be treated. The laser energy output was verified by meter reading.      The clinically evident lesion(s) was/were treated with Carmela Vbeam pulsed dye laser (595 nm) beam as above. Total of pulses used is recorded above under laser settings. The patient tolerated the procedure well and no complications were noted. Post operative instructions were provided. The total laser operation and preparation time was 10 minutes.  The patient was counseled to call immediately for any issues and read the after visit summary for emergency contact information. The patient was counseled that [employer] messaging response may be delayed by several days, therefore, phone is preferred for emergency issues.     The patient will follow-up in 4-6 weeks.    The patient will NOT pay the cosmetic fee today.    Rosahn Woo MD staffed the patient was present for identifying and marking target area(s),    Staff Involved:  Fellow/Attending Staff (as above)    Scribe Disclosure:  I, Anjel Anderson, am serving as a scribe to document services personally performed by Roshan Woo MD based on data collection and the provider's statements to me.       Attending attestation:  I was present for key elements of the procedure and immediately available for all other portions of the procedure.  I have reviewed the note and edited it as necessary.    Roshan Woo M.D.  Professor  Director of Dermatologic Surgery  Department of Dermatology  [facility]    Dermatology Surgery Clinic  [facility]  [address] [place] [id number]        Dermatology Laser Intake Checklist:  History of psoriasis: No  History of recent tan, indoor or outdoor tanning/vacation or other sun exposure: No  History of vitiligo: No  Family history of vitiligo: No  Recent other cosmetic procedure(microderm abrasion/peel/hair removal/facial etc):  No  History of HSV: No  Did the patient start valtrex: No  For genital laser hair removal patient only: Is there a history of genital warts or condyloma: No  Tattoo in the area to be treated: No  Is patient using hydroquinone: No  Retinoids and other acne medications stopped for 2 weeks: No  Has the patient had accutane in the last 6-12 months: No  Pregnant or breastfeeding: No  History of skin cancer in area planned for treatment: Yes  History of treatment with gold: No  Changes in medical history: No  Photos obtained: Yes  Does the patient smoke: No  Is the patient on ibuprofen/aspirin/plavix/coumadin/other blood thinner: No  If patient is taking narcotic or diazepam(valium)-does patient have : No  There were no vitals taken for this visit.

## 2021-11-10 NOTE — PROGRESS NOTES
Laser - Vbeam (Pulsed Dye Laser, PDL) Procedure Note: Medical    Procedure Date: Nov 10, 2021    Attending Staff Surgeon: Roshan Woo MD    Fellow Surgeon: Dionisio Finnegan MD    Resident Surgeon: Rosa Esposito MD    Assistant: Yessi Wells CMA    Operating Room Data:     Surgery/Procedure Date:    SAME     Pre-operative Diagnosis:   Scar  Location: right lower extremity/ankle  Size (cm2): 12 x 10 cm  Number of lesions: 1 (ring shaped scar)     Operation/Procedure    Vbeam pulsed dye laser treatment #: 7    Post-operative Diagnosis:  SAME    Laser Settings:  Energy: 6.5 J/cm2  Spot size: 12 mm  Pulse width: 10 mS (0.45 thru 40 mS)  Dynamic cooling spray settin mS  Dynamic cooling device delay:  20 mS  Total pulses used: 95    Fotofinder photos: No    Anesthesia:  None    Description of Operation/Procedure:   The nature and purpose of the procedure, associated risks, possible consequences, complications and alternative methods of treatment were explained in detail, this includes but is not limited to hyperpigmentation, hypopigmentation, scarring, bruising, hair loss pain/discomfort, eye injury, hair loss,  and blister. We reviewed that the outcome could be any of the following: no improvement, slight improvement or change in skin color & texture, the skin might be permanently lighter or darker, and though uncommon, superficial scarring may occur.  Multiple treatments may be recommended.    A photo and operative consent were obtained. Time-out was performed.The patient was positioned to optimally expose the area treated. Protective eyewear was worn by the patient and goggles on all personnel in the treatment room. The patient confirmed the site to be treated. The laser energy output was verified by meter reading.      The clinically evident lesion(s) was/were treated with Carmela Vbeam pulsed dye laser (595 nm) beam as above. Total of pulses used is recorded above under laser settings. The patient tolerated the  procedure well and no complications were noted. Post operative instructions were provided. The total laser operation and preparation time was 10 minutes.  The patient was counseled to call immediately for any issues and read the after visit summary for emergency contact information. The patient was counseled that MyChart messaging response may be delayed by several days, therefore, phone is preferred for emergency issues.     The patient will follow-up in 4-6 weeks.    The patient will NOT pay the cosmetic fee today.    Roshan Woo MD staffed the patient was present for identifying and marking target area(s),    Staff Involved:  Fellow/Attending Staff (as above)    Scribe Disclosure:  I, Anjel Anderson, am serving as a scribe to document services personally performed by Roshan Woo MD based on data collection and the provider's statements to me.       Attending attestation:  I was present for key elements of the procedure and immediately available for all other portions of the procedure.  I have reviewed the note and edited it as necessary.    Roshan Woo M.D.  Professor  Director of Dermatologic Surgery  Department of Dermatology  [facility]    Dermatology Surgery Clinic  [facility] [place] [id number]

## 2021-11-10 NOTE — PROGRESS NOTES
Dermatology Laser Intake Checklist:  History of psoriasis: No  History of recent tan, indoor or outdoor tanning/vacation or other sun exposure: No  History of vitiligo: No  Family history of vitiligo: No  Recent other cosmetic procedure(microderm abrasion/peel/hair removal/facial etc): No  History of HSV: No  Did the patient start valtrex: No  For genital laser hair removal patient only: Is there a history of genital warts or condyloma: No  Tattoo in the area to be treated: No  Is patient using hydroquinone: No  Retinoids and other acne medications stopped for 2 weeks: No  Has the patient had accutane in the last 6-12 months: No  Pregnant or breastfeeding: No  History of skin cancer in area planned for treatment: Yes  History of treatment with gold: No  Changes in medical history: No  Photos obtained: Yes  Does the patient smoke: No  Is the patient on ibuprofen/aspirin/plavix/coumadin/other blood thinner: No  If patient is taking narcotic or diazepam(valium)-does patient have : No  There were no vitals taken for this visit.

## 2021-12-22 ENCOUNTER — OFFICE VISIT (OUTPATIENT)
Dept: DERMATOLOGY | Facility: CLINIC | Age: 46
End: 2021-12-22
Payer: COMMERCIAL

## 2021-12-22 DIAGNOSIS — L91.0 HYPERTROPHIC SCAR: Primary | ICD-10-CM

## 2021-12-22 PROCEDURE — 99024 POSTOP FOLLOW-UP VISIT: CPT | Mod: GC | Performed by: DERMATOLOGY

## 2021-12-22 NOTE — LETTER
2021       RE: Marina Urbina  [address] [facility] [address]     Dear Colleague,    Thank you for referring your patient, Marina Urbina, to the [facility] DERMATOLOGIC SURGERY CLINIC [place] at [facility]. Please see a copy of my visit note below.    Laser- VBeam(Pulsed Dye Laser) Procedure Note: Medical    Procedure Date: Dec 22, 2021    Attending Staff Surgeon: Roshan Woo MD    Fellow Surgeon: Dionisio Finnegan MD    Assistant: Yessi Wells CMA    Operating Room Data:     Surgery/Procedure Date:    SAME     Pre-operative Diagnosis:   Scar  Location: right lower extremity/ankle  Size(cm2): 12.0 cm x 10.0 cm  Number of lesions: 1 (ring shaped scar)    Operation/Procedure    Vbeam pulsed dye laser treatment #: 8       Post-operative Diagnosis:  SAME    Laser Settings:  Energy: 6.5 J/cm2  Spot size:12 mm  Pulse width:  10 mS (0.45 thru 40 mS)  Dynamic cooling spray settin mS  Dynamic cooling device delay:  20 mS  Total pulses used: 71    Fotofinder photos: No    Anesthesia:  None    Description of Operation/Procedure:   The nature and purpose of the procedure, associated risks, possible consequences, complications and alternative methods of treatment were explained in detail, this includes but is not limited to hyperpigmentation, hypopigmentation, scarring, bruising, hair loss pain/discomfort, eye injury, hair loss,  and blister. We reviewed that the outcome could be any of the following: no improvement, slight improvement or change in skin color & texture, the skin might be permanently lighter or darker, and though uncommon, superficial scarring may occur.  Multiple treatments may be recommended.   A photo and operative consent were obtained. Time-out was performed.The patient was positioned to optimally expose the area treated. Protective eyewear was worn by the patient and goggles on all personnel in the treatment room. The patient  confirmed the site to be treated. The laser energy output was verified by meter reading.      The clinically evident lesion(s) was/were treated with Carmela Vbeam pulsed dye laser (595 nm) beam as above. A total of 71 pulses were used. The patient tolerated the procedure well and no complications were noted. Post operative instructions were provided. The total laser operation and preparation time was 15 minutes.  The patient was counseled to call immediately for any issues and read the after visit summary for emergency contact information. The patient was counseled that [employer] messaging response may be delayed by several days, therefore, phone is preferred for emergency issues.     The patient will follow-up in 4-6.    The patient will NOT pay the cosmetic fee today.     Dr. Woo staffed the patient was present for identifying and marking target area(s),      Staff Involved:  Staff/fellow/resident as above    Scribe Disclosure:  Verónica HARRIS, am serving as a scribe to document services personally performed by Roshan Woo MD based on data collection and the provider's statements to me.

## 2021-12-22 NOTE — PROGRESS NOTES
Laser- VBeam(Pulsed Dye Laser) Procedure Note: Medical    Procedure Date: Dec 22, 2021    Attending Staff Surgeon: Roshan Woo MD    Fellow Surgeon: Dionisio Finnegan MD    Assistant: Yessi Wells CMA    Operating Room Data:     Surgery/Procedure Date:    SAME     Pre-operative Diagnosis:   Scar  Location: right lower extremity/ankle  Size(cm2): 12.0 cm x 10.0 cm  Number of lesions: 1 (ring shaped scar)    Operation/Procedure    Vbeam pulsed dye laser treatment #: 8       Post-operative Diagnosis:  SAME    Laser Settings:  Energy: 6.5 J/cm2  Spot size:12 mm  Pulse width:  10 mS (0.45 thru 40 mS)  Dynamic cooling spray settin mS  Dynamic cooling device delay:  20 mS  Total pulses used: 71    Fotofinder photos: No    Anesthesia:  None    Description of Operation/Procedure:   The nature and purpose of the procedure, associated risks, possible consequences, complications and alternative methods of treatment were explained in detail, this includes but is not limited to hyperpigmentation, hypopigmentation, scarring, bruising, hair loss pain/discomfort, eye injury, hair loss,  and blister. We reviewed that the outcome could be any of the following: no improvement, slight improvement or change in skin color & texture, the skin might be permanently lighter or darker, and though uncommon, superficial scarring may occur.  Multiple treatments may be recommended.   A photo and operative consent were obtained. Time-out was performed.The patient was positioned to optimally expose the area treated. Protective eyewear was worn by the patient and goggles on all personnel in the treatment room. The patient confirmed the site to be treated. The laser energy output was verified by meter reading.      The clinically evident lesion(s) was/were treated with Carmela Vbeam pulsed dye laser (595 nm) beam as above. A total of 71 pulses were used. The patient tolerated the procedure well and no complications were noted. Post operative  instructions were provided. The total laser operation and preparation time was 15 minutes.  The patient was counseled to call immediately for any issues and read the after visit summary for emergency contact information. The patient was counseled that mychart messaging response may be delayed by several days, therefore, phone is preferred for emergency issues.     The patient will follow-up in 4-6.    The patient will NOT pay the cosmetic fee today.     Dr. Woo staffed the patient was present for identifying and marking target area(s),      Staff Involved:  Staff/fellow/resident as above    Scribe Disclosure:  I, Verónica Ayala, am serving as a scribe to document services personally performed by Roshan Woo MD based on data collection and the provider's statements to me.

## 2022-01-25 ENCOUNTER — TELEPHONE (OUTPATIENT)
Dept: DERMATOLOGY | Facility: CLINIC | Age: 47
End: 2022-01-25
Payer: MEDICAID

## 2022-01-25 NOTE — TELEPHONE ENCOUNTER
Per Dr. Woo, all PDL patients on 1/26 need rescheduled for maintenance. Left patient a voicemail letting her know. Rescheduled for 2/23 at 215pm and advised her to callback if this doesn't work for her.    Eva Alcaraz, Procedure

## 2022-01-30 ENCOUNTER — HEALTH MAINTENANCE LETTER (OUTPATIENT)
Age: 47
End: 2022-01-30

## 2022-02-23 ENCOUNTER — OFFICE VISIT (OUTPATIENT)
Dept: DERMATOLOGY | Facility: CLINIC | Age: 47
End: 2022-02-23
Payer: COMMERCIAL

## 2022-02-23 DIAGNOSIS — L91.0 HYPERTROPHIC SCAR: Primary | ICD-10-CM

## 2022-02-23 PROCEDURE — 99024 POSTOP FOLLOW-UP VISIT: CPT | Performed by: DERMATOLOGY

## 2022-02-23 NOTE — LETTER
2022       RE: Marina Urbina  [address] [facility] [address]     Dear Colleague,    Thank you for referring your patient, Marina Urbina, to the [facility] DERMATOLOGIC SURGERY CLINIC [place] [facility]. Please see a copy of my visit note below.    Laser - Vbeam (Pulsed Dye Laser, PDL) Procedure Note: Medical    Procedure Date: 2022    Attending Staff Surgeon: Roshan Woo MD    Fellow Surgeon: Dionisio Finnegan MD    Resident Surgeon: None    Assistant: Alta Farnsworth RN    Operating Room Data:     Surgery/Procedure Date:    SAME     Pre-operative Diagnosis:   Scar  Location: right lower extremity/ankle  Size (cm2): 12.0 cm x 10.0 cm  Number of lesions: 1 (ring shaped scar)    Operation/Procedure    Vbeam pulsed dye laser treatment #: 9     Post-operative Diagnosis:  SAME    Laser Settings:  Energy: 6.5 J/cm2  Spot size: 12 mm  Pulse width: 10 mS (0.45 thru 40 mS)  Dynamic cooling spray settin mS  Dynamic cooling device delay:  20 mS  Total pulses used: 63    Fotofinder photos: No    Anesthesia:  None    Description of Operation/Procedure:   The nature and purpose of the procedure, associated risks, possible consequences, complications and alternative methods of treatment were explained in detail, this includes but is not limited to hyperpigmentation, hypopigmentation, scarring, bruising, hair loss pain/discomfort, eye injury, hair loss,  and blister. We reviewed that the outcome could be any of the following: no improvement, slight improvement or change in skin color & texture, the skin might be permanently lighter or darker, and though uncommon, superficial scarring may occur.  Multiple treatments may be recommended.    A photo and operative consent were obtained. Time-out was performed.The patient was positioned to optimally expose the area treated. Protective eyewear was worn by the patient and goggles on all personnel  in the treatment room. The patient confirmed the site to be treated. The laser energy output was verified by meter reading.      The clinically evident lesion(s) was/were treated with Carmela Vbeam pulsed dye laser (595 nm) beam as above. Total of pulses used is recorded above under laser settings. The patient tolerated the procedure well and no complications were noted. Post operative instructions were provided. The total laser operation and preparation time was 10 minutes.  The patient was counseled to call immediately for any issues and read the after visit summary for emergency contact information. The patient was counseled that [employer] messaging response may be delayed by several days, therefore, phone is preferred for emergency issues.     The patient will follow-up PRN.    The patient will NOT pay the cosmetic fee today.     Roshan Woo MD staffed the patient was present for identifying and marking target area(s),    Staff Involved:  Fellow/Attending Staff (as above)    Scribe Disclosure:  I, Verónica Ayala, am serving as a scribe to document services personally performed by Roshan Woo MD based on data collection and the provider's statements to me.     Attestation signed by Roshan Woo MD at 2/28/2022 12:30 PM:    Attending attestation:  I was present for key elements of the procedure and immediately available for all other portions of the procedure.  I have reviewed the note and edited it as necessary.    Roshan Woo M.D.  Professor  Director of Dermatologic Surgery  Department of Dermatology  [facility]    Dermatology Surgery Clinic  [facility]  [address] [place], [place] [id number]

## 2022-02-23 NOTE — PROGRESS NOTES
Laser - Vbeam (Pulsed Dye Laser, PDL) Procedure Note: Medical    Procedure Date: 2022    Attending Staff Surgeon: Roshan Woo MD    Fellow Surgeon: Dionisio Finnegan MD    Resident Surgeon: None    Assistant: Alta Farnsworth RN    Operating Room Data:     Surgery/Procedure Date:    SAME     Pre-operative Diagnosis:   Scar  Location: right lower extremity/ankle  Size (cm2): 12.0 cm x 10.0 cm  Number of lesions: 1 (ring shaped scar)    Operation/Procedure    Vbeam pulsed dye laser treatment #: 9     Post-operative Diagnosis:  SAME    Laser Settings:  Energy: 6.5 J/cm2  Spot size: 12 mm  Pulse width: 10 mS (0.45 thru 40 mS)  Dynamic cooling spray settin mS  Dynamic cooling device delay:  20 mS  Total pulses used: 63    Fotofinder photos: No    Anesthesia:  None    Description of Operation/Procedure:   The nature and purpose of the procedure, associated risks, possible consequences, complications and alternative methods of treatment were explained in detail, this includes but is not limited to hyperpigmentation, hypopigmentation, scarring, bruising, hair loss pain/discomfort, eye injury, hair loss,  and blister. We reviewed that the outcome could be any of the following: no improvement, slight improvement or change in skin color & texture, the skin might be permanently lighter or darker, and though uncommon, superficial scarring may occur.  Multiple treatments may be recommended.    A photo and operative consent were obtained. Time-out was performed.The patient was positioned to optimally expose the area treated. Protective eyewear was worn by the patient and goggles on all personnel in the treatment room. The patient confirmed the site to be treated. The laser energy output was verified by meter reading.      The clinically evident lesion(s) was/were treated with [place] Vbeam pulsed dye laser (595 nm) beam as above. Total of pulses used is recorded above under laser settings. The patient tolerated the  procedure well and no complications were noted. Post operative instructions were provided. The total laser operation and preparation time was 10 minutes.  The patient was counseled to call immediately for any issues and read the after visit summary for emergency contact information. The patient was counseled that MyChart messaging response may be delayed by several days, therefore, phone is preferred for emergency issues.     The patient will follow-up PRN.    The patient will NOT pay the cosmetic fee today.     Roshan Woo MD staffed the patient was present for identifying and marking target area(s),    Staff Involved:  Fellow/Attending Staff (as above)    Scribe Disclosure:  IVerónica, am serving as a scribe to document services personally performed by Roshan Woo MD based on data collection and the provider's statements to me.

## 2022-09-24 ENCOUNTER — HEALTH MAINTENANCE LETTER (OUTPATIENT)
Age: 47
End: 2022-09-24

## 2023-01-29 ENCOUNTER — HEALTH MAINTENANCE LETTER (OUTPATIENT)
Age: 48
End: 2023-01-29

## 2023-10-08 ENCOUNTER — HEALTH MAINTENANCE LETTER (OUTPATIENT)
Age: 48
End: 2023-10-08

## 2024-02-25 ENCOUNTER — HEALTH MAINTENANCE LETTER (OUTPATIENT)
Age: 49
End: 2024-02-25

## 2025-03-15 ENCOUNTER — HEALTH MAINTENANCE LETTER (OUTPATIENT)
Age: 50
End: 2025-03-15

## (undated) RX ORDER — TRIAMCINOLONE ACETONIDE 40 MG/ML
INJECTION, SUSPENSION INTRA-ARTICULAR; INTRAMUSCULAR
Status: DISPENSED
Start: 2021-05-18

## (undated) RX ORDER — TRIAMCINOLONE ACETONIDE 40 MG/ML
INJECTION, SUSPENSION INTRA-ARTICULAR; INTRAMUSCULAR
Status: DISPENSED
Start: 2020-08-05